# Patient Record
Sex: MALE | HISPANIC OR LATINO
[De-identification: names, ages, dates, MRNs, and addresses within clinical notes are randomized per-mention and may not be internally consistent; named-entity substitution may affect disease eponyms.]

---

## 2017-01-16 ENCOUNTER — PRIOR ORIGINAL RECORDS (OUTPATIENT)
Dept: OTHER | Age: 52
End: 2017-01-16

## 2017-01-17 LAB
ALT (SGPT): 37 U/L
AST (SGOT): 27 U/L
CHOLESTEROL, TOTAL: 125 MG/DL
HDL CHOLESTEROL: 43 MG/DL
LDL CHOLESTEROL: 52 MG/DL
TRIGLYCERIDES: 149 MG/DL

## 2017-01-23 ENCOUNTER — PRIOR ORIGINAL RECORDS (OUTPATIENT)
Dept: OTHER | Age: 52
End: 2017-01-23

## 2017-01-23 LAB
BILIRUBIN TOTAL: 0.7 MG/DL
GLOBULIN: 2.8 G/DL
SGOT (AST): 27 IU/L
SGPT (ALT): 37 IU/L

## 2017-02-01 ENCOUNTER — PRIOR ORIGINAL RECORDS (OUTPATIENT)
Dept: OTHER | Age: 52
End: 2017-02-01

## 2017-06-30 ENCOUNTER — PRIOR ORIGINAL RECORDS (OUTPATIENT)
Dept: OTHER | Age: 52
End: 2017-06-30

## 2017-07-01 ENCOUNTER — LAB ENCOUNTER (OUTPATIENT)
Dept: LAB | Facility: HOSPITAL | Age: 52
End: 2017-07-01
Attending: INTERNAL MEDICINE
Payer: COMMERCIAL

## 2017-07-01 ENCOUNTER — PRIOR ORIGINAL RECORDS (OUTPATIENT)
Dept: OTHER | Age: 52
End: 2017-07-01

## 2017-07-01 DIAGNOSIS — E78.2 MIXED HYPERLIPIDEMIA: Primary | ICD-10-CM

## 2017-07-01 DIAGNOSIS — E78.00 HYPERCHOLESTEREMIA: ICD-10-CM

## 2017-07-01 LAB
ALT SERPL-CCNC: 40 U/L (ref 17–63)
AST SERPL-CCNC: 31 U/L (ref 15–41)
CHOLEST SERPL-MCNC: 134 MG/DL (ref 110–200)
CK SERPL-CCNC: 113 U/L (ref 49–397)
HDLC SERPL-MCNC: 43 MG/DL
LDLC SERPL CALC-MCNC: 65 MG/DL (ref 0–99)
NONHDLC SERPL-MCNC: 91 MG/DL
TRIGL SERPL-MCNC: 131 MG/DL (ref 1–149)

## 2017-07-01 PROCEDURE — 84450 TRANSFERASE (AST) (SGOT): CPT

## 2017-07-01 PROCEDURE — 82550 ASSAY OF CK (CPK): CPT

## 2017-07-01 PROCEDURE — 84460 ALANINE AMINO (ALT) (SGPT): CPT

## 2017-07-01 PROCEDURE — 36415 COLL VENOUS BLD VENIPUNCTURE: CPT

## 2017-07-01 PROCEDURE — 80061 LIPID PANEL: CPT

## 2017-07-03 ENCOUNTER — APPOINTMENT (OUTPATIENT)
Dept: ULTRASOUND IMAGING | Facility: HOSPITAL | Age: 52
End: 2017-07-03
Payer: COMMERCIAL

## 2017-07-03 ENCOUNTER — HOSPITAL ENCOUNTER (EMERGENCY)
Facility: HOSPITAL | Age: 52
Discharge: HOME | End: 2017-07-03
Payer: COMMERCIAL

## 2017-07-03 VITALS
BODY MASS INDEX: 29.73 KG/M2 | OXYGEN SATURATION: 97 % | DIASTOLIC BLOOD PRESSURE: 86 MMHG | HEART RATE: 75 BPM | RESPIRATION RATE: 18 BRPM | WEIGHT: 185 LBS | TEMPERATURE: 99 F | SYSTOLIC BLOOD PRESSURE: 129 MMHG | HEIGHT: 66 IN

## 2017-07-03 DIAGNOSIS — J02.9 ACUTE PHARYNGITIS, UNSPECIFIED ETIOLOGY: ICD-10-CM

## 2017-07-03 DIAGNOSIS — N50.819 TESTICULAR PAIN: Primary | ICD-10-CM

## 2017-07-03 LAB
BACTERIA UR QL AUTO: NEGATIVE /HPF
BILIRUB UR QL: NEGATIVE
CLARITY UR: CLEAR
COLOR UR: YELLOW
GLUCOSE UR-MCNC: NEGATIVE MG/DL
KETONES UR-MCNC: NEGATIVE MG/DL
LEUKOCYTE ESTERASE UR QL STRIP.AUTO: NEGATIVE
NITRITE UR QL STRIP.AUTO: NEGATIVE
PH UR: 6 [PH] (ref 5–8)
PROT UR-MCNC: NEGATIVE MG/DL
RBC #/AREA URNS AUTO: 7 /HPF
S PYO AG THROAT QL: NEGATIVE
SP GR UR STRIP: 1.02 (ref 1–1.03)
UROBILINOGEN UR STRIP-ACNC: <2
VIT C UR-MCNC: NEGATIVE MG/DL
WBC #/AREA URNS AUTO: 1 /HPF

## 2017-07-03 PROCEDURE — 93975 VASCULAR STUDY: CPT

## 2017-07-03 PROCEDURE — 99284 EMERGENCY DEPT VISIT MOD MDM: CPT

## 2017-07-03 PROCEDURE — 76870 US EXAM SCROTUM: CPT

## 2017-07-03 PROCEDURE — 87430 STREP A AG IA: CPT

## 2017-07-03 PROCEDURE — 87591 N.GONORRHOEAE DNA AMP PROB: CPT | Performed by: NURSE PRACTITIONER

## 2017-07-03 PROCEDURE — 81001 URINALYSIS AUTO W/SCOPE: CPT

## 2017-07-03 PROCEDURE — 87491 CHLMYD TRACH DNA AMP PROBE: CPT | Performed by: NURSE PRACTITIONER

## 2017-07-03 NOTE — ED INITIAL ASSESSMENT (HPI)
Sore throat x 1 week, c/o left testicle pain x 4 days.  Pt denies testicular swelling, denies injury

## 2017-07-04 NOTE — ED PROVIDER NOTES
Patient Seen in: [facility] AND [facility] Emergency Department    History   CC: sore throat and testicular pain  HPI: Kavita Maximiliano 46year old male  who presents to the ER c/o sore throat that has been presence, constant in nature for the past approximately tablet (81 mg total) by mouth daily. Atorvastatin Calcium 20 MG Oral Tab,  Take 1 tablet (20 mg total) by mouth nightly. RaNITidine HCl 150 MG Oral Tab,  Take 150 mg by mouth nightly. Constitutional and vital signs reviewed.         Physical E Impression:  Testicular pain  (primary encounter diagnosis)  Acute pharyngitis, unspecified etiology    Disposition:  Discharge    Follow-up:  Radha Urias MD  [address]  [phone]    Schedule an appointment as soon

## 2017-07-04 NOTE — ED NOTES
Patient accompanied to ED with family for c/o left testicular pain and sore throat X1 wk, patient denies any fever/chills, was on abx for throat infection couple of weeks ago. Patient denies any swelling or injury to scrotum.   Patient denies any urinary co

## 2017-07-05 ENCOUNTER — PRIOR ORIGINAL RECORDS (OUTPATIENT)
Dept: OTHER | Age: 52
End: 2017-07-05

## 2017-07-05 LAB
C TRACH DNA SPEC QL NAA+PROBE: NEGATIVE
N GONORRHOEA DNA SPEC QL NAA+PROBE: NEGATIVE

## 2017-07-06 ENCOUNTER — TELEPHONE (OUTPATIENT)
Dept: SURGERY | Facility: CLINIC | Age: 52
End: 2017-07-06

## 2017-07-06 DIAGNOSIS — R35.1 NOCTURIA: ICD-10-CM

## 2017-07-06 DIAGNOSIS — Z12.5 PROSTATE CANCER SCREENING: ICD-10-CM

## 2017-07-06 DIAGNOSIS — R35.1 BPH ASSOCIATED WITH NOCTURIA: Primary | ICD-10-CM

## 2017-07-06 DIAGNOSIS — N40.1 BPH ASSOCIATED WITH NOCTURIA: Primary | ICD-10-CM

## 2017-07-06 NOTE — TELEPHONE ENCOUNTER
Pt. wants to know if he needs to get any labs done before he comes in for f/up on 7/17? Pt. Was last seen on 3/17/15.

## 2017-07-07 NOTE — TELEPHONE ENCOUNTER
, please see message below. I have pended the orders for you, please review and sign,if you agree. See portion of plan from your lov copied and pasted below as follows. Please note , LOV was 3/17/15    3.  If above tests normal, return to see us in 1

## 2017-07-09 NOTE — TELEPHONE ENCOUNTER
Please notify patient that he is to get PSA and complete urinalysis before visit with me--lab orders signed.   Thanks, Dr. Sabrina Shields

## 2017-07-10 ENCOUNTER — PRIOR ORIGINAL RECORDS (OUTPATIENT)
Dept: OTHER | Age: 52
End: 2017-07-10

## 2017-07-10 NOTE — TELEPHONE ENCOUNTER
LM for pt about the instructions below and told him the orders are in the system and to cb if questions.

## 2017-07-12 ENCOUNTER — APPOINTMENT (OUTPATIENT)
Dept: LAB | Facility: HOSPITAL | Age: 52
End: 2017-07-12
Attending: UROLOGY
Payer: COMMERCIAL

## 2017-07-12 DIAGNOSIS — Z12.5 PROSTATE CANCER SCREENING: ICD-10-CM

## 2017-07-12 DIAGNOSIS — R35.1 NOCTURIA: ICD-10-CM

## 2017-07-12 LAB
BACTERIA UR QL AUTO: NEGATIVE /HPF
BILIRUB UR QL: NEGATIVE
COLOR UR: YELLOW
GLUCOSE UR-MCNC: NEGATIVE MG/DL
HGB UR QL STRIP.AUTO: NEGATIVE
KETONES UR-MCNC: NEGATIVE MG/DL
LEUKOCYTE ESTERASE UR QL STRIP.AUTO: NEGATIVE
NITRITE UR QL STRIP.AUTO: NEGATIVE
PH UR: 7 [PH] (ref 5–8)
PROT UR-MCNC: NEGATIVE MG/DL
PSA SERPL-MCNC: 1.4 NG/ML (ref 0–4)
RBC #/AREA URNS AUTO: 2 /HPF
SP GR UR STRIP: 1.02 (ref 1–1.03)
UROBILINOGEN UR STRIP-ACNC: <2
VIT C UR-MCNC: 20 MG/DL
WBC #/AREA URNS AUTO: <1 /HPF

## 2017-07-12 PROCEDURE — 81003 URINALYSIS AUTO W/O SCOPE: CPT

## 2017-07-12 PROCEDURE — 36415 COLL VENOUS BLD VENIPUNCTURE: CPT

## 2017-07-17 ENCOUNTER — OFFICE VISIT (OUTPATIENT)
Dept: SURGERY | Facility: CLINIC | Age: 52
End: 2017-07-17

## 2017-07-17 ENCOUNTER — APPOINTMENT (OUTPATIENT)
Dept: LAB | Facility: HOSPITAL | Age: 52
End: 2017-07-17
Attending: UROLOGY
Payer: COMMERCIAL

## 2017-07-17 VITALS
TEMPERATURE: 98 F | WEIGHT: 190 LBS | BODY MASS INDEX: 30.53 KG/M2 | HEIGHT: 66 IN | RESPIRATION RATE: 16 BRPM | DIASTOLIC BLOOD PRESSURE: 80 MMHG | SYSTOLIC BLOOD PRESSURE: 110 MMHG | HEART RATE: 73 BPM

## 2017-07-17 DIAGNOSIS — Z12.5 PROSTATE CANCER SCREENING: ICD-10-CM

## 2017-07-17 DIAGNOSIS — R31.29 MICROHEMATURIA: ICD-10-CM

## 2017-07-17 DIAGNOSIS — N50.812 TESTICULAR PAIN, LEFT: Primary | ICD-10-CM

## 2017-07-17 DIAGNOSIS — R35.1 NOCTURIA: ICD-10-CM

## 2017-07-17 DIAGNOSIS — N32.0 BLADDER NECK OBSTRUCTION: ICD-10-CM

## 2017-07-17 LAB — PSA SERPL-MCNC: 1.5 NG/ML (ref 0–4)

## 2017-07-17 PROCEDURE — 99215 OFFICE O/P EST HI 40 MIN: CPT | Performed by: UROLOGY

## 2017-07-17 PROCEDURE — 99212 OFFICE O/P EST SF 10 MIN: CPT | Performed by: UROLOGY

## 2017-07-17 PROCEDURE — 36415 COLL VENOUS BLD VENIPUNCTURE: CPT

## 2017-07-17 RX ORDER — AMOXICILLIN AND CLAVULANATE POTASSIUM 875; 125 MG/1; MG/1
TABLET, FILM COATED ORAL
Refills: 0 | COMMUNITY
Start: 2017-05-23 | End: 2017-08-18

## 2017-07-17 RX ORDER — ASPIRIN 81 MG/1
81 TABLET ORAL
Refills: 7 | COMMUNITY
Start: 2017-05-19 | End: 2017-07-17

## 2017-07-17 RX ORDER — IBUPROFEN 600 MG/1
TABLET ORAL
Refills: 0 | COMMUNITY
Start: 2017-05-23

## 2017-07-17 RX ORDER — ROSUVASTATIN CALCIUM 5 MG/1
2.5 TABLET, COATED ORAL NIGHTLY
COMMUNITY
Start: 2017-07-10

## 2017-07-17 NOTE — PATIENT INSTRUCTIONS
1.  This evening urine specimen for cytology and the laboratory--look for possible microscopic tumor cells in the urine    2. CT urogram (CT of abdomen and pelvis with and without IV contrast) = to investigate the microscopic blood in your urine.   The CT thinners, or anti-platelet or anti-inflammatory medicines. These include ibuprofen and naproxen. These thin the blood and may increase bleeding. Follow-up care  Follow up with your healthcare provider, or as advised.  If you were injured and had blood in y

## 2017-07-17 NOTE — PROGRESS NOTES
HPI:    Patient ID: Hesham Brown is a 46year old male. HPI   1.  Voiding Dysfunction  Patient has current AUA score of 5, mild voiding dysfunction category, compared to previous score of 9, moderate voiding dysfunction category, on 3/17/2015 per [employer] ordered Uroxatral but patient admits he never took it.  consultation With me January 31, 2000, for voluntary sterilization. October 21, 2002, office visit, patient complaining that his stream was weak half of the time.  A month prior to that, he complained cancer-prostate or  [Other] [OTHER]     • No urolithiasis [Other] [OTHER]        Smoking status: Former Smoker                                                              Packs/day: 0.00      Years: 0.00         Types: Cigarettes  Comment: 1 unit per [employer] kg/m².    LABORATORIES  7/12/2017: RBC: 2;   7/12/2017: PSA: 1.4  7/5/2017: Chlamydia/GC: negative  7/3/2017: rbc: 7  11/21/2016: Creatine: 0.76, eGFR= >60  3/7/2015: PSA 1.0 and normal  Esimated GFR 2/24/15 = greater than 60, normal  Total PSA 3/7/12 = 0. understands all of this and wants to proceed with proposed workup and procedures listed above. He wants the cystoscopy, possible biopsy, possible bilateral retrograde pyelogram, under general anesthesia and does not want MAC anesthesia.  Pre-op instructions UROGRAM(W+WO)(CPT=74178)       PU#[id number]  By signing my name below, Mancil Primer,  attest that this documentation has been prepared under the direction and in the presence of Dimas Jordan MD.   Electronically Signed:  Carlos Haque, 7/17/2017, 6:01 P

## 2017-07-18 ENCOUNTER — TELEPHONE (OUTPATIENT)
Dept: SURGERY | Facility: CLINIC | Age: 52
End: 2017-07-18

## 2017-07-18 LAB — NON GYNE INTERPRETATION: NEGATIVE

## 2017-07-24 NOTE — ADDENDUM NOTE
Encounter addended by: Shakila Albarran MD on: 7/23/2017  8:31 PM<BR>    Actions taken: Letter status changed

## 2017-08-10 ENCOUNTER — TELEPHONE (OUTPATIENT)
Dept: SURGERY | Facility: CLINIC | Age: 52
End: 2017-08-10

## 2017-08-14 NOTE — TELEPHONE ENCOUNTER
Spoke with patient, has scheduled Ct Urogram for Thursday 08/17/17, procedure is also scheduled for Cysto, possible urine biopsy, possible bilateral rgpg on Thursday 08/24/17, at [address]/outpaient went over pre-op/ labs with patient will mail out

## 2017-08-17 ENCOUNTER — HOSPITAL ENCOUNTER (OUTPATIENT)
Dept: CT IMAGING | Facility: HOSPITAL | Age: 52
Discharge: HOME | End: 2017-08-17
Attending: UROLOGY
Payer: COMMERCIAL

## 2017-08-17 DIAGNOSIS — R31.29 MICROHEMATURIA: ICD-10-CM

## 2017-08-17 LAB — CREAT BLD-MCNC: 0.8 MG/DL (ref 0.5–1.5)

## 2017-08-17 PROCEDURE — 82565 ASSAY OF CREATININE: CPT

## 2017-08-17 PROCEDURE — 74178 CT ABD&PLV WO CNTR FLWD CNTR: CPT | Performed by: UROLOGY

## 2017-08-18 ENCOUNTER — TELEPHONE (OUTPATIENT)
Dept: SURGERY | Facility: CLINIC | Age: 52
End: 2017-08-18

## 2017-08-18 NOTE — TELEPHONE ENCOUNTER
Spoke with pt and gave results and instructions and he verbalized understanding and will comply. I also gave Kristofer Ordoñez a copy of the CT scan for the pt's surgery chart.

## 2017-08-18 NOTE — TELEPHONE ENCOUNTER
----- Message from Ok Stephenson MD sent at 8/18/2017  7:21 AM CDT -----  Elle Aguilera, please call patient notify him that CT urogram did not show any cancers or stones of the urinary tract; there is a tiny cyst in the right kidney; that tiny cyst is not can

## 2017-08-24 ENCOUNTER — ANESTHESIA EVENT (OUTPATIENT)
Dept: SURGERY | Facility: HOSPITAL | Age: 52
End: 2017-08-24
Payer: COMMERCIAL

## 2017-08-24 ENCOUNTER — TELEPHONE (OUTPATIENT)
Dept: SURGERY | Facility: CLINIC | Age: 52
End: 2017-08-24

## 2017-08-24 ENCOUNTER — SURGERY (OUTPATIENT)
Age: 52
End: 2017-08-24

## 2017-08-24 ENCOUNTER — HOSPITAL ENCOUNTER (OUTPATIENT)
Facility: HOSPITAL | Age: 52
Setting detail: HOSPITAL OUTPATIENT SURGERY
Discharge: HOME | End: 2017-08-24
Attending: UROLOGY | Admitting: UROLOGY
Payer: COMMERCIAL

## 2017-08-24 ENCOUNTER — ANESTHESIA (OUTPATIENT)
Dept: SURGERY | Facility: HOSPITAL | Age: 52
End: 2017-08-24
Payer: COMMERCIAL

## 2017-08-24 ENCOUNTER — APPOINTMENT (OUTPATIENT)
Dept: GENERAL RADIOLOGY | Facility: HOSPITAL | Age: 52
End: 2017-08-24
Attending: UROLOGY
Payer: COMMERCIAL

## 2017-08-24 VITALS
WEIGHT: 180 LBS | OXYGEN SATURATION: 99 % | HEART RATE: 53 BPM | DIASTOLIC BLOOD PRESSURE: 73 MMHG | SYSTOLIC BLOOD PRESSURE: 115 MMHG | TEMPERATURE: 98 F | RESPIRATION RATE: 13 BRPM | BODY MASS INDEX: 28.93 KG/M2 | HEIGHT: 66 IN

## 2017-08-24 DIAGNOSIS — R31.29 MICROHEMATURIA: ICD-10-CM

## 2017-08-24 DIAGNOSIS — R31.29 MICROHEMATURIA: Primary | ICD-10-CM

## 2017-08-24 DIAGNOSIS — Z12.5 PROSTATE CANCER SCREENING: Primary | ICD-10-CM

## 2017-08-24 PROCEDURE — BT141ZZ FLUOROSCOPY OF KIDNEYS, URETERS AND BLADDER USING LOW OSMOLAR CONTRAST: ICD-10-PCS | Performed by: UROLOGY

## 2017-08-24 PROCEDURE — 3E0K8KZ INTRODUCTION OF OTHER DIAGNOSTIC SUBSTANCE INTO GENITOURINARY TRACT, VIA NATURAL OR ARTIFICIAL OPENING ENDOSCOPIC: ICD-10-PCS | Performed by: UROLOGY

## 2017-08-24 PROCEDURE — 52005 CYSTO W/URTRL CATHJ: CPT | Performed by: UROLOGY

## 2017-08-24 PROCEDURE — 0TJB8ZZ INSPECTION OF BLADDER, VIA NATURAL OR ARTIFICIAL OPENING ENDOSCOPIC: ICD-10-PCS | Performed by: UROLOGY

## 2017-08-24 PROCEDURE — 74420 UROGRAPHY RTRGR +-KUB: CPT | Performed by: UROLOGY

## 2017-08-24 RX ORDER — FAMOTIDINE 20 MG/1
20 TABLET ORAL ONCE
Status: COMPLETED | OUTPATIENT
Start: 2017-08-24 | End: 2017-08-24

## 2017-08-24 RX ORDER — ONDANSETRON 2 MG/ML
4 INJECTION INTRAMUSCULAR; INTRAVENOUS ONCE AS NEEDED
Status: DISCONTINUED | OUTPATIENT
Start: 2017-08-24 | End: 2017-08-24

## 2017-08-24 RX ORDER — HYDROCODONE BITARTRATE AND ACETAMINOPHEN 5; 325 MG/1; MG/1
2 TABLET ORAL AS NEEDED
Status: DISCONTINUED | OUTPATIENT
Start: 2017-08-24 | End: 2017-08-24

## 2017-08-24 RX ORDER — MORPHINE SULFATE 4 MG/ML
6 INJECTION, SOLUTION INTRAMUSCULAR; INTRAVENOUS EVERY 10 MIN PRN
Status: DISCONTINUED | OUTPATIENT
Start: 2017-08-24 | End: 2017-08-24

## 2017-08-24 RX ORDER — HYDROCODONE BITARTRATE AND ACETAMINOPHEN 5; 325 MG/1; MG/1
1 TABLET ORAL AS NEEDED
Status: DISCONTINUED | OUTPATIENT
Start: 2017-08-24 | End: 2017-08-24

## 2017-08-24 RX ORDER — HYDROCODONE BITARTRATE AND ACETAMINOPHEN 5; 325 MG/1; MG/1
1 TABLET ORAL EVERY 4 HOURS PRN
Status: CANCELLED | OUTPATIENT
Start: 2017-08-24

## 2017-08-24 RX ORDER — ONDANSETRON 2 MG/ML
INJECTION INTRAMUSCULAR; INTRAVENOUS AS NEEDED
Status: DISCONTINUED | OUTPATIENT
Start: 2017-08-24 | End: 2017-08-24 | Stop reason: SURG

## 2017-08-24 RX ORDER — ACETAMINOPHEN 325 MG/1
650 TABLET ORAL EVERY 4 HOURS PRN
Status: CANCELLED | OUTPATIENT
Start: 2017-08-24

## 2017-08-24 RX ORDER — NALOXONE HYDROCHLORIDE 0.4 MG/ML
80 INJECTION, SOLUTION INTRAMUSCULAR; INTRAVENOUS; SUBCUTANEOUS AS NEEDED
Status: DISCONTINUED | OUTPATIENT
Start: 2017-08-24 | End: 2017-08-24

## 2017-08-24 RX ORDER — SODIUM CHLORIDE, SODIUM LACTATE, POTASSIUM CHLORIDE, CALCIUM CHLORIDE 600; 310; 30; 20 MG/100ML; MG/100ML; MG/100ML; MG/100ML
INJECTION, SOLUTION INTRAVENOUS CONTINUOUS PRN
Status: DISCONTINUED | OUTPATIENT
Start: 2017-08-24 | End: 2017-08-24 | Stop reason: SURG

## 2017-08-24 RX ORDER — HYDROMORPHONE HYDROCHLORIDE 1 MG/ML
0.6 INJECTION, SOLUTION INTRAMUSCULAR; INTRAVENOUS; SUBCUTANEOUS EVERY 5 MIN PRN
Status: DISCONTINUED | OUTPATIENT
Start: 2017-08-24 | End: 2017-08-24

## 2017-08-24 RX ORDER — MORPHINE SULFATE 4 MG/ML
4 INJECTION, SOLUTION INTRAMUSCULAR; INTRAVENOUS EVERY 10 MIN PRN
Status: DISCONTINUED | OUTPATIENT
Start: 2017-08-24 | End: 2017-08-24

## 2017-08-24 RX ORDER — LIDOCAINE HYDROCHLORIDE 20 MG/ML
JELLY TOPICAL AS NEEDED
Status: DISCONTINUED | OUTPATIENT
Start: 2017-08-24 | End: 2017-08-24 | Stop reason: HOSPADM

## 2017-08-24 RX ORDER — SODIUM CHLORIDE, SODIUM LACTATE, POTASSIUM CHLORIDE, CALCIUM CHLORIDE 600; 310; 30; 20 MG/100ML; MG/100ML; MG/100ML; MG/100ML
INJECTION, SOLUTION INTRAVENOUS CONTINUOUS
Status: DISCONTINUED | OUTPATIENT
Start: 2017-08-24 | End: 2017-08-24

## 2017-08-24 RX ORDER — MORPHINE SULFATE 2 MG/ML
2 INJECTION, SOLUTION INTRAMUSCULAR; INTRAVENOUS EVERY 10 MIN PRN
Status: DISCONTINUED | OUTPATIENT
Start: 2017-08-24 | End: 2017-08-24

## 2017-08-24 RX ORDER — PHENAZOPYRIDINE HYDROCHLORIDE 200 MG/1
200 TABLET, FILM COATED ORAL ONCE AS NEEDED
Status: CANCELLED | OUTPATIENT
Start: 2017-08-24 | End: 2017-08-24

## 2017-08-24 RX ORDER — HYDROMORPHONE HYDROCHLORIDE 1 MG/ML
0.2 INJECTION, SOLUTION INTRAMUSCULAR; INTRAVENOUS; SUBCUTANEOUS EVERY 5 MIN PRN
Status: DISCONTINUED | OUTPATIENT
Start: 2017-08-24 | End: 2017-08-24

## 2017-08-24 RX ORDER — METOCLOPRAMIDE 10 MG/1
10 TABLET ORAL ONCE
Status: COMPLETED | OUTPATIENT
Start: 2017-08-24 | End: 2017-08-24

## 2017-08-24 RX ORDER — MIDAZOLAM HYDROCHLORIDE 1 MG/ML
INJECTION INTRAMUSCULAR; INTRAVENOUS AS NEEDED
Status: DISCONTINUED | OUTPATIENT
Start: 2017-08-24 | End: 2017-08-24 | Stop reason: SURG

## 2017-08-24 RX ORDER — ACETAMINOPHEN 325 MG/1
650 TABLET ORAL ONCE
Status: COMPLETED | OUTPATIENT
Start: 2017-08-24 | End: 2017-08-24

## 2017-08-24 RX ORDER — LIDOCAINE HYDROCHLORIDE 10 MG/ML
INJECTION, SOLUTION EPIDURAL; INFILTRATION; INTRACAUDAL; PERINEURAL AS NEEDED
Status: DISCONTINUED | OUTPATIENT
Start: 2017-08-24 | End: 2017-08-24 | Stop reason: SURG

## 2017-08-24 RX ORDER — HYDROMORPHONE HYDROCHLORIDE 1 MG/ML
0.4 INJECTION, SOLUTION INTRAMUSCULAR; INTRAVENOUS; SUBCUTANEOUS EVERY 5 MIN PRN
Status: DISCONTINUED | OUTPATIENT
Start: 2017-08-24 | End: 2017-08-24

## 2017-08-24 RX ORDER — HYDROCODONE BITARTRATE AND ACETAMINOPHEN 5; 325 MG/1; MG/1
2 TABLET ORAL EVERY 4 HOURS PRN
Status: CANCELLED | OUTPATIENT
Start: 2017-08-24

## 2017-08-24 RX ORDER — LEVOFLOXACIN 500 MG/1
500 TABLET, FILM COATED ORAL ONCE
Status: COMPLETED | OUTPATIENT
Start: 2017-08-24 | End: 2017-08-24

## 2017-08-24 RX ORDER — DEXAMETHASONE SODIUM PHOSPHATE 4 MG/ML
VIAL (ML) INJECTION AS NEEDED
Status: DISCONTINUED | OUTPATIENT
Start: 2017-08-24 | End: 2017-08-24 | Stop reason: SURG

## 2017-08-24 RX ORDER — GENTAMICIN SULFATE 40 MG/ML
INJECTION, SOLUTION INTRAMUSCULAR; INTRAVENOUS AS NEEDED
Status: DISCONTINUED | OUTPATIENT
Start: 2017-08-24 | End: 2017-08-24 | Stop reason: HOSPADM

## 2017-08-24 RX ORDER — PHENAZOPYRIDINE HYDROCHLORIDE 200 MG/1
200 TABLET, FILM COATED ORAL 3 TIMES DAILY PRN
Qty: 15 TABLET | Refills: 1 | Status: SHIPPED | OUTPATIENT
Start: 2017-08-24

## 2017-08-24 RX ADMIN — ONDANSETRON 4 MG: 2 INJECTION INTRAMUSCULAR; INTRAVENOUS at 09:48:00

## 2017-08-24 RX ADMIN — MIDAZOLAM HYDROCHLORIDE 2 MG: 1 INJECTION INTRAMUSCULAR; INTRAVENOUS at 09:08:00

## 2017-08-24 RX ADMIN — SODIUM CHLORIDE, SODIUM LACTATE, POTASSIUM CHLORIDE, CALCIUM CHLORIDE: 600; 310; 30; 20 INJECTION, SOLUTION INTRAVENOUS at 09:45:00

## 2017-08-24 RX ADMIN — DEXAMETHASONE SODIUM PHOSPHATE 4 MG: 4 MG/ML VIAL (ML) INJECTION at 09:22:00

## 2017-08-24 RX ADMIN — SODIUM CHLORIDE, SODIUM LACTATE, POTASSIUM CHLORIDE, CALCIUM CHLORIDE: 600; 310; 30; 20 INJECTION, SOLUTION INTRAVENOUS at 09:11:00

## 2017-08-24 RX ADMIN — LIDOCAINE HYDROCHLORIDE 50 MG: 10 INJECTION, SOLUTION EPIDURAL; INFILTRATION; INTRACAUDAL; PERINEURAL at 09:15:00

## 2017-08-24 NOTE — ANESTHESIA PREPROCEDURE EVALUATION
Anesthesia PreOp Note    HPI:     Mahin Cope is a 46year old male who presents for preoperative consultation requested by: Kelsey Sutton MD    Date of Surgery: 8/24/2017    Procedure(s):  CYSTOSCOPY RETROGRADE  Indication: microhematuria      Hi Glo Sesay CRNA 250 mg at 08/24/17 0915   dexamethasone Sodium Phosphate (DECADRON) 4 MG/ML injection Intravenous PRN Glo Sesay CRNA 4 mg at 08/24/17 6921   lactated ringers infusion Intravenous Continuous PRN Glo Sesay II  Neck ROM: full  Dental - normal exam     Pulmonary - normal exam   Cardiovascular - normal exam  (+) CAD,     Neuro/Psych      GI/Hepatic/Renal    (+) liver disease,     Endo/Other    Abdominal  - normal exam             Anesthesia Plan:   ASA:  2  Evaristo

## 2017-08-24 NOTE — ANESTHESIA POSTPROCEDURE EVALUATION
Patient: Estela Lou    Procedure Summary     Date:  08/24/17 Room / Location:  [address] [place] [address] MAIN OR 14 / [address] [place] [address] MAIN OR    Anesthesia Start:  3677 Anesthesia Stop:  1001    Procedure:  CYSTOSCOPY RETROGRADE (Bilateral ) Diagnosis:  (microhematuria)    Surgeon:  Madhu Garrett

## 2017-08-24 NOTE — INTERVAL H&P NOTE
Pre-op Diagnosis: microhematuria    Today patient denies any gross hematuria or flank pain.]\    Today on physical exam--vital signs stable. Cardiac exam--regular rate and rhythm with normal S1, S2 without murmurs. Lungs are clear to auscultation.   Flank

## 2017-08-24 NOTE — H&P
Patient ID: Nikki Hernandez is a 46year old male.     HPI   1.  Voiding Dysfunction  Patient has current AUA score of 5, mild voiding dysfunction category, compared to previous score of 9, moderate voiding dysfunction category, on 3/17/2015 per chart revi Office cystoscopy-high riding median bar the bladder neck causing visual obstruction. No lateral lobe enlargement. No bladder tumors. Bladder one-2+ trabeculated. Length the prostatic urethra 2.5 cm. I ordered Uroxatral but patient admits he never took it. Comment: inguinal hernia  No date: VASECTOMY            Family History   Problem Relation Age of Onset   • Diabetes Father     • Other[Other] [OTHER] Mother         cirrhosis of the liver   • Diabetes Brother     • No cancer-prostate or  [Other] [OTH Psychiatric: His behavior is normal.   Nursing note and vitals reviewed.          07/17/17  1756   BP: 110/80   Pulse: 73   Resp: 16   Temp: 98.2 °F (36.8 °C)   TempSrc: Oral   Weight: 190 lb (86.2 kg)   Height: 5' 6\" (1.676 m)            Body mass index Plan: On 7/3/17, urine RBC 7. 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   Therefore I recommend urine cytolo 2.  CT urogram (CT of abdomen and pelvis with and without IV contrast) = to investigate the microscopic blood in your urine.   The CT will also give us information on possible small groin (inguinal) hernias; it may shed light on the cause of your long-stand

## 2017-08-24 NOTE — BRIEF OP NOTE
[facility] POST ANESTHESIA CARE UNIT  Brief Op Note       Patients Name: Billie Valente  Attending Physician: No att. providers found  Operating Physician: Marco Antonio Portillo MD  CSN: [id number]     Location:  OR  MRN: [id number]    Date of Birth: [birth year]

## 2017-08-24 NOTE — TELEPHONE ENCOUNTER
Nurses, please send the following message to patient's primary physician Dr. Carolynn Meek    Urology update    8/24/17 cystoscopy with bladder irrigation for cytology, bilateral retrograde mammogram x-rays at [address] [place] [address], same-day surgery    No tumors or ston

## 2017-08-25 LAB
CHOLESTEROL, TOTAL: 134 MG/DL
HDL CHOLESTEROL: 43 MG/DL
LDL CHOLESTEROL: 65 MG/DL
NON-HDL CHOLESTEROL: 91 MG/DL
TRIGLYCERIDES: 131 MG/DL

## 2017-08-25 NOTE — OPERATIVE REPORT
[facility]    PATIENT'S NAME: Karol Avila PHYSICIAN: Governor Jaylin MD   OPERATING PHYSICIAN: Governor Jaylin MD   PATIENT ACCOUNT#:   [id number]    LOCATION:  [address]  MEDICAL RECORD #:   [id number]       D should return to see me in 1 year and get a blood draw for PSA as well as a urinalysis, urine testing, and urine for cytology before visit with me; he is instructed to call my office in the interim if he develops any new urological problems.       PROCEDURE

## 2017-08-26 ENCOUNTER — LAB ENCOUNTER (OUTPATIENT)
Dept: LAB | Facility: HOSPITAL | Age: 52
End: 2017-08-26
Attending: INTERNAL MEDICINE
Payer: COMMERCIAL

## 2017-08-26 DIAGNOSIS — E78.00 PURE HYPERCHOLESTEROLEMIA: Primary | ICD-10-CM

## 2017-08-26 LAB
ALT SERPL-CCNC: 41 U/L (ref 17–63)
AST SERPL-CCNC: 30 U/L (ref 15–41)
CHOLEST SERPL-MCNC: 87 MG/DL (ref 110–200)
CK SERPL-CCNC: 81 U/L (ref 49–397)
HDLC SERPL-MCNC: 35 MG/DL
LDLC SERPL CALC-MCNC: 33 MG/DL (ref 0–99)
NONHDLC SERPL-MCNC: 52 MG/DL
TRIGL SERPL-MCNC: 96 MG/DL (ref 1–149)

## 2017-08-26 PROCEDURE — 36415 COLL VENOUS BLD VENIPUNCTURE: CPT

## 2017-08-26 PROCEDURE — 82550 ASSAY OF CK (CPK): CPT

## 2017-08-26 PROCEDURE — 80061 LIPID PANEL: CPT

## 2017-08-26 PROCEDURE — 84450 TRANSFERASE (AST) (SGOT): CPT

## 2017-08-26 PROCEDURE — 84460 ALANINE AMINO (ALT) (SGPT): CPT

## 2017-08-30 ENCOUNTER — PRIOR ORIGINAL RECORDS (OUTPATIENT)
Dept: OTHER | Age: 52
End: 2017-08-30

## 2017-08-30 ENCOUNTER — HOSPITAL ENCOUNTER (OUTPATIENT)
Dept: CT IMAGING | Facility: HOSPITAL | Age: 52
Discharge: HOME | End: 2017-08-30
Attending: INTERNAL MEDICINE
Payer: COMMERCIAL

## 2017-08-30 DIAGNOSIS — M54.2 NECK PAIN: ICD-10-CM

## 2017-08-30 LAB — CREAT BLD-MCNC: 0.7 MG/DL (ref 0.5–1.5)

## 2017-08-30 PROCEDURE — 82565 ASSAY OF CREATININE: CPT

## 2017-08-30 PROCEDURE — 70491 CT SOFT TISSUE NECK W/DYE: CPT | Performed by: INTERNAL MEDICINE

## 2017-09-09 ENCOUNTER — OFFICE VISIT (OUTPATIENT)
Dept: OTOLARYNGOLOGY | Facility: CLINIC | Age: 52
End: 2017-09-09

## 2017-09-09 VITALS
SYSTOLIC BLOOD PRESSURE: 115 MMHG | BODY MASS INDEX: 28.93 KG/M2 | DIASTOLIC BLOOD PRESSURE: 73 MMHG | HEIGHT: 66 IN | WEIGHT: 180 LBS | TEMPERATURE: 98 F

## 2017-09-09 DIAGNOSIS — K11.20 SIALOADENITIS OF SUBMANDIBULAR GLAND: Primary | ICD-10-CM

## 2017-09-09 PROCEDURE — 99244 OFF/OP CNSLTJ NEW/EST MOD 40: CPT | Performed by: OTOLARYNGOLOGY

## 2017-09-09 PROCEDURE — 99212 OFFICE O/P EST SF 10 MIN: CPT | Performed by: OTOLARYNGOLOGY

## 2017-09-09 RX ORDER — ESOMEPRAZOLE STRONTIUM 49.3 MG/1
49.3 CAPSULE, DELAYED RELEASE ORAL DAILY
COMMUNITY

## 2017-09-09 NOTE — PROGRESS NOTES
Khalida Boateng is a 46year old male.   Patient presents with:  Jaw Pain: Pt experience jaw pain left side      HISTORY OF PRESENT ILLNESS  He presents with a history of recurrent episodes of submandibular pain and discomfort posteriorly with a CT scan per vomiting)    • Visual impairment        Past Surgical History:  No date: CHOLECYSTECTOMY      Comment: Laparoscopic   No date: HERNIA SURGERY Right      Comment: inguinal hernia  No date: VASECTOMY  No date: VASECTOMY      REVIEW OF SYSTEMS    System Neg/P Normal External nose - Normal. Lips/teeth/gums - Normal. Tonsils - Normal. Oropharynx - Normal.  Floor of the mouth with palpable 1 cm stone   Nose/Mouth/Throat Normal Nares - Right: Normal Left: Normal. Septum -Normal  Turbinates - Right: Normal, Left: No

## 2017-09-14 ENCOUNTER — PRIOR ORIGINAL RECORDS (OUTPATIENT)
Dept: OTHER | Age: 52
End: 2017-09-14

## 2017-09-19 ENCOUNTER — TELEPHONE (OUTPATIENT)
Dept: OTOLARYNGOLOGY | Facility: CLINIC | Age: 52
End: 2017-09-19

## 2017-09-19 ENCOUNTER — PRIOR ORIGINAL RECORDS (OUTPATIENT)
Dept: OTHER | Age: 52
End: 2017-09-19

## 2017-09-19 NOTE — TELEPHONE ENCOUNTER
Pt states he has had poison ivy for the past week, asking if he needs to rs 9/27 sx? Pls advise thank you.

## 2017-09-20 ENCOUNTER — PRIOR ORIGINAL RECORDS (OUTPATIENT)
Dept: OTHER | Age: 52
End: 2017-09-20

## 2017-09-20 NOTE — TELEPHONE ENCOUNTER
Spoke to pt, has poison ivy on both arms only, he is currently taking care of it with Over the counter creams. Dr. Gardenia Payton pt wants to know if he can have his surgery done 9/27/17 (Excision of left submandibular ductal stones at [facility])?

## 2017-09-25 ENCOUNTER — PRIOR ORIGINAL RECORDS (OUTPATIENT)
Dept: OTHER | Age: 52
End: 2017-09-25

## 2017-09-27 ENCOUNTER — LAB REQUISITION (OUTPATIENT)
Dept: LAB | Facility: HOSPITAL | Age: 52
End: 2017-09-27
Payer: COMMERCIAL

## 2017-09-27 DIAGNOSIS — Z01.89 ENCOUNTER FOR OTHER SPECIFIED SPECIAL EXAMINATIONS (CODE): ICD-10-CM

## 2017-09-27 PROCEDURE — 88300 SURGICAL PATH GROSS: CPT | Performed by: OTOLARYNGOLOGY

## 2017-09-28 ENCOUNTER — TELEPHONE (OUTPATIENT)
Dept: OTOLARYNGOLOGY | Facility: CLINIC | Age: 52
End: 2017-09-28

## 2017-09-28 NOTE — TELEPHONE ENCOUNTER
Per pt is doing well, slight swelling but no bleeding or fever. Pt taking antibiotic and pain medications as prescribed.  Advised pt that swelling is expected, and per , no post op visit follow up needed and per  and pt informed of results

## 2017-12-21 ENCOUNTER — HOSPITAL ENCOUNTER (OUTPATIENT)
Facility: HOSPITAL | Age: 52
Setting detail: OBSERVATION
Discharge: HOME | End: 2017-12-22
Attending: EMERGENCY MEDICINE | Admitting: HOSPITALIST
Payer: COMMERCIAL

## 2017-12-21 ENCOUNTER — APPOINTMENT (OUTPATIENT)
Dept: GENERAL RADIOLOGY | Facility: HOSPITAL | Age: 52
End: 2017-12-21
Attending: EMERGENCY MEDICINE
Payer: COMMERCIAL

## 2017-12-21 DIAGNOSIS — R07.9 CHEST PAIN OF UNCERTAIN ETIOLOGY: Primary | ICD-10-CM

## 2017-12-21 PROBLEM — R73.9 HYPERGLYCEMIA: Status: ACTIVE | Noted: 2017-12-21

## 2017-12-21 PROBLEM — E87.6 HYPOKALEMIA: Status: ACTIVE | Noted: 2017-12-21

## 2017-12-21 PROCEDURE — 99220 INITIAL OBSERVATION CARE,LEVL III: CPT | Performed by: HOSPITALIST

## 2017-12-21 PROCEDURE — 71010 XR CHEST AP PORTABLE  (CPT=71010): CPT | Performed by: EMERGENCY MEDICINE

## 2017-12-21 RX ORDER — UBIDECARENONE 100 MG
100 CAPSULE ORAL DAILY
COMMUNITY

## 2017-12-21 RX ORDER — NITROGLYCERIN 0.4 MG/1
0.4 TABLET SUBLINGUAL ONCE
Status: COMPLETED | OUTPATIENT
Start: 2017-12-21 | End: 2017-12-21

## 2017-12-21 RX ORDER — MORPHINE SULFATE 2 MG/ML
1 INJECTION, SOLUTION INTRAMUSCULAR; INTRAVENOUS EVERY 2 HOUR PRN
Status: DISCONTINUED | OUTPATIENT
Start: 2017-12-21 | End: 2017-12-22

## 2017-12-21 RX ORDER — SODIUM CHLORIDE 0.9 % (FLUSH) 0.9 %
3 SYRINGE (ML) INJECTION AS NEEDED
Status: DISCONTINUED | OUTPATIENT
Start: 2017-12-21 | End: 2017-12-22

## 2017-12-21 RX ORDER — ONDANSETRON 2 MG/ML
4 INJECTION INTRAMUSCULAR; INTRAVENOUS EVERY 6 HOURS PRN
Status: DISCONTINUED | OUTPATIENT
Start: 2017-12-21 | End: 2017-12-22

## 2017-12-21 RX ORDER — NITROGLYCERIN 0.4 MG/1
TABLET SUBLINGUAL
Status: COMPLETED
Start: 2017-12-21 | End: 2017-12-21

## 2017-12-21 RX ORDER — MORPHINE SULFATE 4 MG/ML
4 INJECTION, SOLUTION INTRAMUSCULAR; INTRAVENOUS EVERY 2 HOUR PRN
Status: DISCONTINUED | OUTPATIENT
Start: 2017-12-21 | End: 2017-12-22

## 2017-12-21 RX ORDER — MORPHINE SULFATE 2 MG/ML
INJECTION, SOLUTION INTRAMUSCULAR; INTRAVENOUS
Status: COMPLETED
Start: 2017-12-21 | End: 2017-12-21

## 2017-12-21 RX ORDER — MORPHINE SULFATE 2 MG/ML
2 INJECTION, SOLUTION INTRAMUSCULAR; INTRAVENOUS ONCE
Status: COMPLETED | OUTPATIENT
Start: 2017-12-21 | End: 2017-12-21

## 2017-12-21 RX ORDER — HEPARIN SODIUM 5000 [USP'U]/ML
5000 INJECTION, SOLUTION INTRAVENOUS; SUBCUTANEOUS EVERY 12 HOURS SCHEDULED
Status: DISCONTINUED | OUTPATIENT
Start: 2017-12-21 | End: 2017-12-22

## 2017-12-21 RX ORDER — ASPIRIN 81 MG/1
324 TABLET, CHEWABLE ORAL ONCE
Status: COMPLETED | OUTPATIENT
Start: 2017-12-21 | End: 2017-12-21

## 2017-12-21 RX ORDER — MORPHINE SULFATE 2 MG/ML
2 INJECTION, SOLUTION INTRAMUSCULAR; INTRAVENOUS EVERY 2 HOUR PRN
Status: DISCONTINUED | OUTPATIENT
Start: 2017-12-21 | End: 2017-12-22

## 2017-12-21 RX ORDER — ACETAMINOPHEN 325 MG/1
650 TABLET ORAL EVERY 6 HOURS PRN
Status: DISCONTINUED | OUTPATIENT
Start: 2017-12-21 | End: 2017-12-22

## 2017-12-21 RX ORDER — ASPIRIN 325 MG
325 TABLET ORAL DAILY
Status: DISCONTINUED | OUTPATIENT
Start: 2017-12-21 | End: 2017-12-22

## 2017-12-21 RX ORDER — NITROGLYCERIN 0.4 MG/1
0.4 TABLET SUBLINGUAL EVERY 5 MIN PRN
Status: DISCONTINUED | OUTPATIENT
Start: 2017-12-21 | End: 2017-12-22

## 2017-12-21 RX ORDER — TAMSULOSIN HYDROCHLORIDE 0.4 MG/1
0.4 CAPSULE ORAL DAILY
COMMUNITY

## 2017-12-21 RX ORDER — POTASSIUM CHLORIDE 20 MEQ/1
40 TABLET, EXTENDED RELEASE ORAL EVERY 4 HOURS
Status: COMPLETED | OUTPATIENT
Start: 2017-12-21 | End: 2017-12-21

## 2017-12-21 NOTE — CONSULTS
Pedro NOTE  Cardiology Consultation    Radha Spray Patient Status:  Observation    1965 MRN [id number]   Location [facility] 3W/SW Attending Neeta Tubbs MD   Hosp Day # 0 PCP Yadi Yi MD, Valencia Armstrong MD     [facility] and cholesterol have been well treated    History:  Past Medical History:   Diagnosis Date   • BPH (benign prostatic hyperplasia)    • Coronary atherosclerosis    • Disorder of liver     fatty liver   • Esophageal reflux    • H/O right inguinal hernia repa Flush 0.9 % injection 3 mL, 3 mL, Intravenous, PRN  •  Heparin Sodium (Porcine) 5000 UNIT/ML injection 5,000 Units, 5,000 Units, Subcutaneous, 2 times per day  •  acetaminophen (TYLENOL) tab 650 mg, 650 mg, Oral, Q6H PRN  •  ondansetron HCl (ZOFRAN) inject 250 12/21/2017   CREATSERUM 0.78 12/21/2017   BUN 7 12/21/2017    12/21/2017   K 3.3 12/21/2017    12/21/2017   CO2 23 12/21/2017    12/21/2017   CA 8.6 12/21/2017   DDIMER 0.40 12/21/2017   TROP 0.00 12/21/2017       Imaging:  Stable ch

## 2017-12-21 NOTE — PLAN OF CARE
Problem: SAFETY ADULT - FALL  Goal: Free from fall injury  INTERVENTIONS:  - Assess pt frequently for physical needs  - Identify cognitive and physical deficits and behaviors that affect risk of falls.   - [place] fall precautions as indicated by assessme

## 2017-12-21 NOTE — H&P
[facility]    PATIENT'S NAME: Ally Medina PHYSICIAN: Sukhi Prater MD   PATIENT ACCOUNT#:   [id number]    LOCATION:  [place] [address]  MEDICAL RECORD #:   [id number]       YOB: 1965  ADMISSION DATE:       12/21/ time, place, and person. Anxious, but no distress. VITAL SIGNS:  Temperature 98.7, pulse 100, respiratory rate 14, blood pressure 139/91, pulse oximetry 99% on room air. HEENT:  Atraumatic. Oropharynx clear with moist mucous membranes.   Normal hard a

## 2017-12-21 NOTE — ED PROVIDER NOTES
Patient Seen in: [facility] AND [facility] Emergency Department    History   Patient presents with:  Chest Pain    Stated Complaint: Chest pain    HPI    Patient presents the emergency department complaining of chest pain.   He states that while he was driving he (37.1 °C) (Temporal)   Resp 14   Ht 167.6 cm (5' 6\")   Wt 78.5 kg   SpO2 96%   BMI 27.92 kg/m²         Physical Exam   Constitutional: He is oriented to person, place, and time. He appears well-developed and well-nourished. No distress.    HENT:   Head: No 1630  ------------------------------------------------------------       [facility]     Pulse Ox: 96%, Normal,     Cardiac Monitor: Pulse Readings from Last 1 Encounters:  12/21/17 : 77  , sinus,      Radiology findings: Xr Chest Ap Portable  (cpt=71010)    Resul

## 2017-12-21 NOTE — HISTORICAL OFFICE NOTE
Sagar Ness  : 1965  ACCOUNT:  [id number]  [phone]  PCP:    TODAY'S DATE: 2017  DICTATED BY:  Crys Murphy MD]    CHIEF COMPLAINT: [Followup of CAD, of native vessels, Followup of Hyperlipidemia and mixed.]    HPI: [On 2017, G 110/60, Pulse - 74, Respiration - 18, Weight - 185, Height - 66, BMI - 29.9 ]    CONS: well developed, well nourished. WEIGHT: BMI parameters reviewed and discussed. HEAD/FACE: no trauma and normocephalic.  EYES: conjunctivae not injected and no xanthelasma

## 2017-12-21 NOTE — ED INITIAL ASSESSMENT (HPI)
Pt states that he felt a left-sided chest pressure about 1 hour prior to arrival.  This lasted for a minute. He denies shortness of breath, nausea, dizziness. He was admitted pain-free.   He states this happened to him a few months ago but he did not seek

## 2017-12-22 ENCOUNTER — APPOINTMENT (OUTPATIENT)
Dept: CV DIAGNOSTICS | Facility: HOSPITAL | Age: 52
End: 2017-12-22
Attending: INTERNAL MEDICINE
Payer: COMMERCIAL

## 2017-12-22 ENCOUNTER — APPOINTMENT (OUTPATIENT)
Dept: NUCLEAR MEDICINE | Facility: HOSPITAL | Age: 52
End: 2017-12-22
Attending: INTERNAL MEDICINE
Payer: COMMERCIAL

## 2017-12-22 VITALS
SYSTOLIC BLOOD PRESSURE: 114 MMHG | HEIGHT: 66 IN | DIASTOLIC BLOOD PRESSURE: 66 MMHG | HEART RATE: 88 BPM | TEMPERATURE: 98 F | RESPIRATION RATE: 17 BRPM | OXYGEN SATURATION: 95 % | WEIGHT: 163.13 LBS | BODY MASS INDEX: 26.22 KG/M2

## 2017-12-22 PROCEDURE — 99217 OBSERVATION CARE DISCHARGE: CPT | Performed by: HOSPITALIST

## 2017-12-22 PROCEDURE — 93306 TTE W/DOPPLER COMPLETE: CPT | Performed by: INTERNAL MEDICINE

## 2017-12-22 PROCEDURE — 93017 CV STRESS TEST TRACING ONLY: CPT | Performed by: INTERNAL MEDICINE

## 2017-12-22 PROCEDURE — 78452 HT MUSCLE IMAGE SPECT MULT: CPT | Performed by: INTERNAL MEDICINE

## 2017-12-22 RX ORDER — SODIUM CHLORIDE 9 MG/ML
INJECTION, SOLUTION INTRAVENOUS
Status: COMPLETED
Start: 2017-12-22 | End: 2017-12-22

## 2017-12-22 NOTE — PROGRESS NOTES
12/21/17 8464   Clinical Encounter Type   Visited With Patient   Routine Visit Introduction  ([facility])   Continue Visiting Yes   Patient Spiritual Encounters   Spiritual Assessment Completed 1   Adaptation to Hospital 3   Fear Level 4   F

## 2017-12-22 NOTE — PROGRESS NOTES
[facility]    Progress Note    Nikki Hilton Patient Status:  Observation    1965 MRN [id number]   Location [facility] 3W/SW Attending Russell Garcia MD   Hosp Day # 0 PCP Ema Pacheco MD, Kisha Crain MD     Subjective: change Electronically signed on 12/21/2017 at 16:19 by Amaya Gore MD    Ekg 12-lead    Result Date: 12/21/2017  ECG Report  Interpretation  -------------------------- Sinus Tachycardia WITHIN NORMAL LIMITS When compared with ECG of 12/21/2017 14:16:20 El

## 2017-12-22 NOTE — DISCHARGE SUMMARY
[facility] HOSPITALIST  DISCHARGE SUMMARY     Nikki Hernandez Patient Status:  Observation    1965 MRN [id number]   Location [facility] 3W/SW Attending Jose Barrett MD   Hosp Day # 0 PCP Harinder Bhatt MD, Latonia Kong MD     Date of Admission: 15 HOURS   Refills:  0     Phenazopyridine HCl 200 MG Tabs  Commonly known as:  PYRIDIUM      Take 1 tablet (200 mg total) by mouth 3 (three) times daily as needed (for burning pain with urination).    Quantity:  15 tablet  Refills:  1     Rosuvastatin Calcium has Moderate Risk of readmission after discharge from the hospital.        Amaya Bergeron MD 12/22/2017    Time spent:  > 35 minutes

## 2017-12-22 NOTE — PLAN OF CARE
CARDIOVASCULAR - ADULT    • Maintains optimal cardiac output and hemodynamic stability Progressing    • Absence of cardiac arrhythmias or at baseline Progressing    No chest pain, plan for stress test this AM     DISCHARGE PLANNING    • Discharge to home o

## 2018-10-16 ENCOUNTER — PRIOR ORIGINAL RECORDS (OUTPATIENT)
Dept: OTHER | Age: 53
End: 2018-10-16

## 2018-10-25 ENCOUNTER — PRIOR ORIGINAL RECORDS (OUTPATIENT)
Dept: OTHER | Age: 53
End: 2018-10-25

## 2018-10-26 LAB
ALT (SGPT): 32 U/L
AST (SGOT): 25 U/L
CHOLESTEROL, TOTAL: 87 MG/DL
HDL CHOLESTEROL: 43 MG/DL
LDL CHOLESTEROL: 29 MG/DL
TRIGLYCERIDES: 66 MG/DL

## 2018-10-29 ENCOUNTER — PRIOR ORIGINAL RECORDS (OUTPATIENT)
Dept: OTHER | Age: 53
End: 2018-10-29

## 2018-10-31 ENCOUNTER — PRIOR ORIGINAL RECORDS (OUTPATIENT)
Dept: OTHER | Age: 53
End: 2018-10-31

## 2018-11-14 ENCOUNTER — MYAURORA ACCOUNT LINK (OUTPATIENT)
Dept: OTHER | Age: 53
End: 2018-11-14

## 2018-11-14 ENCOUNTER — PRIOR ORIGINAL RECORDS (OUTPATIENT)
Dept: OTHER | Age: 53
End: 2018-11-14

## 2018-11-20 ENCOUNTER — PRIOR ORIGINAL RECORDS (OUTPATIENT)
Dept: OTHER | Age: 53
End: 2018-11-20

## 2019-02-28 VITALS
WEIGHT: 185 LBS | DIASTOLIC BLOOD PRESSURE: 60 MMHG | HEART RATE: 74 BPM | OXYGEN SATURATION: 97 % | BODY MASS INDEX: 29.73 KG/M2 | RESPIRATION RATE: 18 BRPM | HEIGHT: 66 IN | SYSTOLIC BLOOD PRESSURE: 110 MMHG

## 2019-02-28 VITALS
BODY MASS INDEX: 30.7 KG/M2 | HEART RATE: 75 BPM | HEIGHT: 66 IN | OXYGEN SATURATION: 98 % | WEIGHT: 191 LBS | SYSTOLIC BLOOD PRESSURE: 124 MMHG | DIASTOLIC BLOOD PRESSURE: 80 MMHG

## 2019-02-28 VITALS
HEART RATE: 64 BPM | SYSTOLIC BLOOD PRESSURE: 104 MMHG | BODY MASS INDEX: 28.77 KG/M2 | WEIGHT: 179 LBS | DIASTOLIC BLOOD PRESSURE: 60 MMHG | HEIGHT: 66 IN

## 2019-03-01 VITALS
DIASTOLIC BLOOD PRESSURE: 60 MMHG | WEIGHT: 185 LBS | HEIGHT: 66 IN | BODY MASS INDEX: 29.73 KG/M2 | HEART RATE: 72 BPM | SYSTOLIC BLOOD PRESSURE: 124 MMHG | OXYGEN SATURATION: 99 %

## 2019-04-17 RX ORDER — ROSUVASTATIN CALCIUM 5 MG/1
TABLET, COATED ORAL
COMMUNITY

## 2019-10-30 ENCOUNTER — APPOINTMENT (OUTPATIENT)
Dept: CARDIOLOGY | Age: 54
End: 2019-10-30

## 2023-07-28 ENCOUNTER — OFFICE VISIT (OUTPATIENT)
Dept: FAMILY MEDICINE CLINIC | Facility: CLINIC | Age: 58
End: 2023-07-28

## 2023-07-28 VITALS
HEART RATE: 71 BPM | BODY MASS INDEX: 32.3 KG/M2 | SYSTOLIC BLOOD PRESSURE: 135 MMHG | WEIGHT: 201 LBS | HEIGHT: 66 IN | DIASTOLIC BLOOD PRESSURE: 89 MMHG | OXYGEN SATURATION: 96 %

## 2023-07-28 DIAGNOSIS — Z00.00 WELL ADULT EXAM: Primary | ICD-10-CM

## 2023-07-28 DIAGNOSIS — E78.5 HYPERLIPIDEMIA, UNSPECIFIED HYPERLIPIDEMIA TYPE: ICD-10-CM

## 2023-07-28 DIAGNOSIS — I10 ESSENTIAL HYPERTENSION: ICD-10-CM

## 2023-07-28 DIAGNOSIS — N64.4 BREAST PAIN, LEFT: ICD-10-CM

## 2023-07-28 DIAGNOSIS — K21.9 GASTROESOPHAGEAL REFLUX DISEASE WITHOUT ESOPHAGITIS: ICD-10-CM

## 2023-07-28 PROCEDURE — 99396 PREV VISIT EST AGE 40-64: CPT | Performed by: NURSE PRACTITIONER

## 2023-07-28 PROCEDURE — 99204 OFFICE O/P NEW MOD 45 MIN: CPT | Performed by: NURSE PRACTITIONER

## 2023-07-28 PROCEDURE — 3075F SYST BP GE 130 - 139MM HG: CPT | Performed by: NURSE PRACTITIONER

## 2023-07-28 PROCEDURE — 3079F DIAST BP 80-89 MM HG: CPT | Performed by: NURSE PRACTITIONER

## 2023-07-28 PROCEDURE — 3008F BODY MASS INDEX DOCD: CPT | Performed by: NURSE PRACTITIONER

## 2023-07-28 RX ORDER — METOPROLOL SUCCINATE 50 MG/1
50 TABLET, EXTENDED RELEASE ORAL DAILY
COMMUNITY
Start: 2023-06-14

## 2023-07-28 RX ORDER — OMEPRAZOLE 20 MG/1
20 CAPSULE, DELAYED RELEASE ORAL DAILY
COMMUNITY
Start: 2023-05-12

## 2023-07-28 RX ORDER — ROSUVASTATIN CALCIUM 5 MG/1
5 TABLET, COATED ORAL NIGHTLY
COMMUNITY

## 2023-08-08 ENCOUNTER — MED REC SCAN ONLY (OUTPATIENT)
Dept: INTERNAL MEDICINE CLINIC | Facility: CLINIC | Age: 58
End: 2023-08-08

## 2023-08-08 ENCOUNTER — LAB ENCOUNTER (OUTPATIENT)
Dept: LAB | Age: 58
End: 2023-08-08
Attending: NURSE PRACTITIONER
Payer: COMMERCIAL

## 2023-08-08 DIAGNOSIS — Z00.00 WELL ADULT EXAM: ICD-10-CM

## 2023-08-08 DIAGNOSIS — R73.01 ELEVATED FASTING BLOOD SUGAR: ICD-10-CM

## 2023-08-08 DIAGNOSIS — R73.01 ELEVATED FASTING BLOOD SUGAR: Primary | ICD-10-CM

## 2023-08-08 LAB
ALBUMIN SERPL-MCNC: 4.1 G/DL (ref 3.4–5)
ALBUMIN/GLOB SERPL: 1.1 {RATIO} (ref 1–2)
ALP LIVER SERPL-CCNC: 92 U/L
ALT SERPL-CCNC: 52 U/L
ANION GAP SERPL CALC-SCNC: 3 MMOL/L (ref 0–18)
AST SERPL-CCNC: 37 U/L (ref 15–37)
BASOPHILS # BLD AUTO: 0.02 X10(3) UL (ref 0–0.2)
BASOPHILS NFR BLD AUTO: 0.3 %
BILIRUB SERPL-MCNC: 0.6 MG/DL (ref 0.1–2)
BUN BLD-MCNC: 13 MG/DL (ref 7–18)
CALCIUM BLD-MCNC: 9.1 MG/DL (ref 8.5–10.1)
CHLORIDE SERPL-SCNC: 107 MMOL/L (ref 98–112)
CHOLEST SERPL-MCNC: 165 MG/DL (ref ?–200)
CO2 SERPL-SCNC: 28 MMOL/L (ref 21–32)
CREAT BLD-MCNC: 0.83 MG/DL
EGFRCR SERPLBLD CKD-EPI 2021: 101 ML/MIN/1.73M2 (ref 60–?)
EOSINOPHIL # BLD AUTO: 0.12 X10(3) UL (ref 0–0.7)
EOSINOPHIL NFR BLD AUTO: 1.9 %
ERYTHROCYTE [DISTWIDTH] IN BLOOD BY AUTOMATED COUNT: 12.4 %
EST. AVERAGE GLUCOSE BLD GHB EST-MCNC: 134 MG/DL (ref 68–126)
FASTING PATIENT LIPID ANSWER: YES
FASTING STATUS PATIENT QL REPORTED: YES
GLOBULIN PLAS-MCNC: 3.6 G/DL (ref 2.8–4.4)
GLUCOSE BLD-MCNC: 113 MG/DL (ref 70–99)
HBA1C MFR BLD: 6.3 % (ref ?–5.7)
HCT VFR BLD AUTO: 48 %
HDLC SERPL-MCNC: 48 MG/DL (ref 40–59)
HGB BLD-MCNC: 16 G/DL
IMM GRANULOCYTES # BLD AUTO: 0.01 X10(3) UL (ref 0–1)
IMM GRANULOCYTES NFR BLD: 0.2 %
LDLC SERPL CALC-MCNC: 98 MG/DL (ref ?–100)
LYMPHOCYTES # BLD AUTO: 2.21 X10(3) UL (ref 1–4)
LYMPHOCYTES NFR BLD AUTO: 34.5 %
MCH RBC QN AUTO: 30.1 PG (ref 26–34)
MCHC RBC AUTO-ENTMCNC: 33.3 G/DL (ref 31–37)
MCV RBC AUTO: 90.4 FL
MONOCYTES # BLD AUTO: 0.49 X10(3) UL (ref 0.1–1)
MONOCYTES NFR BLD AUTO: 7.7 %
NEUTROPHILS # BLD AUTO: 3.55 X10 (3) UL (ref 1.5–7.7)
NEUTROPHILS # BLD AUTO: 3.55 X10(3) UL (ref 1.5–7.7)
NEUTROPHILS NFR BLD AUTO: 55.4 %
NONHDLC SERPL-MCNC: 117 MG/DL (ref ?–130)
OSMOLALITY SERPL CALC.SUM OF ELEC: 287 MOSM/KG (ref 275–295)
PLATELET # BLD AUTO: 278 10(3)UL (ref 150–450)
POTASSIUM SERPL-SCNC: 4.1 MMOL/L (ref 3.5–5.1)
PROT SERPL-MCNC: 7.7 G/DL (ref 6.4–8.2)
RBC # BLD AUTO: 5.31 X10(6)UL
SODIUM SERPL-SCNC: 138 MMOL/L (ref 136–145)
TRIGL SERPL-MCNC: 104 MG/DL (ref 30–149)
TSI SER-ACNC: 2.19 MIU/ML (ref 0.36–3.74)
VLDLC SERPL CALC-MCNC: 17 MG/DL (ref 0–30)
WBC # BLD AUTO: 6.4 X10(3) UL (ref 4–11)

## 2023-08-08 PROCEDURE — 85025 COMPLETE CBC W/AUTO DIFF WBC: CPT

## 2023-08-08 PROCEDURE — 80053 COMPREHEN METABOLIC PANEL: CPT

## 2023-08-08 PROCEDURE — 36415 COLL VENOUS BLD VENIPUNCTURE: CPT

## 2023-08-08 PROCEDURE — 83036 HEMOGLOBIN GLYCOSYLATED A1C: CPT

## 2023-08-08 PROCEDURE — 80061 LIPID PANEL: CPT

## 2023-08-08 PROCEDURE — 84443 ASSAY THYROID STIM HORMONE: CPT

## 2023-08-09 ENCOUNTER — TELEPHONE (OUTPATIENT)
Dept: FAMILY MEDICINE CLINIC | Facility: CLINIC | Age: 58
End: 2023-08-09

## 2023-08-09 DIAGNOSIS — N64.4 BREAST PAIN, LEFT: Primary | ICD-10-CM

## 2023-08-09 NOTE — TELEPHONE ENCOUNTER
Dahlia at Radiology requesting to get a Sarahi zulma diagnostic bilateral with ultrasound. Patient has Left breast pain per order.

## 2023-08-16 ENCOUNTER — HOSPITAL ENCOUNTER (OUTPATIENT)
Dept: MAMMOGRAPHY | Facility: HOSPITAL | Age: 58
Discharge: HOME | End: 2023-08-16
Attending: NURSE PRACTITIONER
Payer: COMMERCIAL

## 2023-08-16 ENCOUNTER — HOSPITAL ENCOUNTER (OUTPATIENT)
Dept: ULTRASOUND IMAGING | Facility: HOSPITAL | Age: 58
Discharge: HOME | End: 2023-08-16
Attending: NURSE PRACTITIONER
Payer: COMMERCIAL

## 2023-08-16 DIAGNOSIS — N64.4 BREAST PAIN, LEFT: ICD-10-CM

## 2023-08-16 PROCEDURE — 76642 ULTRASOUND BREAST LIMITED: CPT | Performed by: NURSE PRACTITIONER

## 2023-08-16 PROCEDURE — 77066 DX MAMMO INCL CAD BI: CPT | Performed by: NURSE PRACTITIONER

## 2023-08-16 PROCEDURE — 77062 BREAST TOMOSYNTHESIS BI: CPT | Performed by: NURSE PRACTITIONER

## 2023-09-18 ENCOUNTER — LAB ENCOUNTER (OUTPATIENT)
Dept: LAB | Age: 58
End: 2023-09-18
Attending: FAMILY MEDICINE
Payer: COMMERCIAL

## 2023-09-18 ENCOUNTER — OFFICE VISIT (OUTPATIENT)
Dept: FAMILY MEDICINE CLINIC | Facility: CLINIC | Age: 58
End: 2023-09-18

## 2023-09-18 VITALS
HEART RATE: 99 BPM | HEIGHT: 66 IN | DIASTOLIC BLOOD PRESSURE: 82 MMHG | RESPIRATION RATE: 17 BRPM | WEIGHT: 202.69 LBS | SYSTOLIC BLOOD PRESSURE: 137 MMHG | BODY MASS INDEX: 32.58 KG/M2

## 2023-09-18 DIAGNOSIS — L29.9 SCALP ITCH: ICD-10-CM

## 2023-09-18 DIAGNOSIS — R07.2 PRECORDIAL PAIN: ICD-10-CM

## 2023-09-18 DIAGNOSIS — R07.2 PRECORDIAL PAIN: Primary | ICD-10-CM

## 2023-09-18 LAB
CHOLEST SERPL-MCNC: 160 MG/DL (ref ?–200)
FASTING PATIENT LIPID ANSWER: YES
HDLC SERPL-MCNC: 45 MG/DL (ref 40–59)
LDLC SERPL CALC-MCNC: 75 MG/DL (ref ?–100)
NONHDLC SERPL-MCNC: 115 MG/DL (ref ?–130)
TRIGL SERPL-MCNC: 243 MG/DL (ref 30–149)
VLDLC SERPL CALC-MCNC: 38 MG/DL (ref 0–30)

## 2023-09-18 PROCEDURE — 36415 COLL VENOUS BLD VENIPUNCTURE: CPT

## 2023-09-18 PROCEDURE — 80061 LIPID PANEL: CPT

## 2023-09-18 RX ORDER — ROSUVASTATIN CALCIUM 5 MG/1
5 TABLET, COATED ORAL NIGHTLY
Qty: 90 TABLET | Refills: 0 | Status: SHIPPED | OUTPATIENT
Start: 2023-09-18

## 2023-09-27 ENCOUNTER — OFFICE VISIT (OUTPATIENT)
Dept: DERMATOLOGY CLINIC | Facility: CLINIC | Age: 58
End: 2023-09-27

## 2023-09-27 DIAGNOSIS — L73.0 ACNE KELOIDALIS NUCHAE: Primary | ICD-10-CM

## 2023-09-27 DIAGNOSIS — L66.1 LICHEN PLANOPILARIS: ICD-10-CM

## 2023-09-27 PROCEDURE — 11900 INJECT SKIN LESIONS </W 7: CPT | Performed by: STUDENT IN AN ORGANIZED HEALTH CARE EDUCATION/TRAINING PROGRAM

## 2023-09-27 PROCEDURE — 99204 OFFICE O/P NEW MOD 45 MIN: CPT | Performed by: STUDENT IN AN ORGANIZED HEALTH CARE EDUCATION/TRAINING PROGRAM

## 2023-09-27 RX ORDER — MINOXIDIL 2.5 MG/1
2.5 TABLET ORAL DAILY
Qty: 180 TABLET | Refills: 0 | Status: SHIPPED | OUTPATIENT
Start: 2023-09-27

## 2023-09-27 RX ORDER — CLOBETASOL PROPIONATE 0.46 MG/ML
SOLUTION TOPICAL
Qty: 50 ML | Refills: 6 | Status: SHIPPED | OUTPATIENT
Start: 2023-09-27

## 2023-09-27 RX ORDER — KETOCONAZOLE 20 MG/ML
SHAMPOO TOPICAL
Qty: 120 ML | Refills: 11 | Status: SHIPPED | OUTPATIENT
Start: 2023-09-27

## 2023-09-27 NOTE — PROGRESS NOTES
September 27, 2023    New patient     CHIEF COMPLAINT: scalp rash    HISTORY OF PRESENT ILLNESS: .    1. Rash  Location: scalp   Duration: over 30 years  Signs and symptoms: itchy and dry  Current treatment: none  Past treatments: medication shampoo, topical lotions, and injections      DERM HISTORY:  History of skin cancer: No  History of chronic skin disease/condition: No    FAMILY HISTORY:  History of melanoma: No  History of chronic skin disease/condition: No    History/Other:    REVIEW OF SYSTEMS:  Constitutional: Denies fever, chills, unintentional weight loss. Skin as per HPI    PAST MEDICAL HISTORY:  Past Medical History:   Diagnosis Date    BPH (benign prostatic hyperplasia)     Coronary atherosclerosis     Disorder of liver     fatty liver    Esophageal reflux     H/O right inguinal hernia repair     High cholesterol     Hyperlipidemia     PONV (postoperative nausea and vomiting)     Visual impairment        Medications  Current Outpatient Medications   Medication Sig Dispense Refill    diclofenac 1 % External Gel Apply 2 g topically 4 (four) times daily. 100 g 1    rosuvastatin 5 MG Oral Tab Take 1 tablet (5 mg total) by mouth nightly. 90 tablet 0    metoprolol succinate ER 50 MG Oral Tablet 24 Hr Take 1 tablet (50 mg total) by mouth daily. omeprazole 20 MG Oral Capsule Delayed Release Take 1 capsule (20 mg total) by mouth daily. aspirin 81 MG Oral Tab Take 1 tablet (81 mg total) by mouth daily. 30 tablet 5       Objective:    PHYSICAL EXAM:  General: awake, alert, no acute distress  Skin: Skin exam was performed today including the following: scalp. Pertinent findings include:   - occipital scalp with sclerotic plaques and perifollicular scaling    ASSESSMENT & PLAN:  Pathophysiology of diagnoses discussed with patient. Therapeutic options reviewed. Risks, benefits, and alternatives discussed with patient. Instructions reviewed at length.     #Acne keloidalis nuchae, burnt out  #Lichen plano pilaris with active perifollicular scaling  - Recommended intralesional Kenalog injections today. Risk benefits, alternatives discussed. Patient agreeable.  -Start clobetasol 0.05% solution to affected areas of scalp twice daily Monday through Friday. Take weekends off.  - Start ketoconazole 2% shampoo. Use 2-3 times weekly. Lather in and leave on for 3 to 5 minutes before washing off.  - Discussed solutions to through your suppression of lichen plano pilaris such as doxycycline twice daily and Plaquenil twice daily. Patient opts to continue with intralesional therapy and topical therapy at this time. Discussed oral minoxidil and possibility of regrowth. Patient interested in and wishing to proceed. - Start oral minoxidil 2.5 mg daily (half a tablet) with food  - Discussed medication usage, dosage, risks, benefits and side effects. ADR discusssed, including, but not limited to:  hypotension, tachycardia, pericardial effusion, edema, and increased facial/body hirsutism. - Intralesional Kenalog (triamcinolone) injection today     Concentration: 5 mg/mL  Site: occipital scalp  Total volume injected: 1.5    Patient was counseled on the possible side effects of injection: pain at site, infection, atrophy of skin, systemic absorption, hypopigmentation, loss of hair. Questions if any were addressed before proceeding with prep of the site with rubbing alcohol. Kenalog was injected with sterile syringe and 25 g needle. Patient tolerated well.      Return to clinic: 3 months or sooner if something concerning arises     Phillip Randall MD

## 2023-11-15 ENCOUNTER — HOSPITAL ENCOUNTER (OUTPATIENT)
Age: 58
Discharge: HOME | End: 2023-11-15
Payer: COMMERCIAL

## 2023-11-15 ENCOUNTER — NURSE TRIAGE (OUTPATIENT)
Dept: FAMILY MEDICINE CLINIC | Facility: CLINIC | Age: 58
End: 2023-11-15

## 2023-11-15 VITALS
TEMPERATURE: 98 F | HEART RATE: 79 BPM | RESPIRATION RATE: 17 BRPM | DIASTOLIC BLOOD PRESSURE: 97 MMHG | SYSTOLIC BLOOD PRESSURE: 147 MMHG | OXYGEN SATURATION: 100 %

## 2023-11-15 DIAGNOSIS — J06.9 VIRAL URI WITH COUGH: Primary | ICD-10-CM

## 2023-11-15 LAB
POCT INFLUENZA A: NEGATIVE
POCT INFLUENZA B: NEGATIVE

## 2023-11-15 RX ORDER — BENZONATATE 100 MG/1
100 CAPSULE ORAL 3 TIMES DAILY PRN
Qty: 30 CAPSULE | Refills: 0 | Status: SHIPPED | OUTPATIENT
Start: 2023-11-15 | End: 2023-12-15

## 2023-11-15 NOTE — DISCHARGE INSTRUCTIONS
Please push fluids and make sure you are staying hydrated. Cool-mist humidifier at night. Steam shower prior to going to bed at night. Close follow-up with your primary care provider is recommended. Any shortness of breath, chest pain or worsening symptoms please go to the ER.

## 2023-11-15 NOTE — TELEPHONE ENCOUNTER
Action Requested: Summary for Provider     []  Critical Lab, Recommendations Needed  [] Need Additional Advice  []   FYI    []   Need Orders  [] Need Medications Sent to Pharmacy  []  Other     SUMMARY: Daughter called (not on RAMIRO), verified patient's Name and . She reports patient has bad colds, mild cough, body aches, and chills that started 2 days ago. Patient tested negative for Covid yesterday. He has been taking TheraFlu. Denies any other complaints at this time. Requesting for an appointment to be seen, however, appointments available only later today. Advised to go to [facility] for evaluation and treatment. [address] clinic locations; she will also check the webside. Care advice given per protocol. Verbalized understanding and agreed with plan of care.     Reason for call: Cold  Onset:     Reason for Disposition   Patient wants to be seen    Protocols used: Common Cold-A-[place]

## 2023-12-14 RX ORDER — ROSUVASTATIN CALCIUM 5 MG/1
5 TABLET, COATED ORAL NIGHTLY
Qty: 90 TABLET | Refills: 2 | Status: SHIPPED | OUTPATIENT
Start: 2023-12-14

## 2023-12-14 NOTE — TELEPHONE ENCOUNTER
Refill passed per [place] [facility], [facility] protocol.     Requested Prescriptions   Pending Prescriptions Disp Refills    ROSUVASTATIN 5 MG Oral Tab [Pharmacy Med Name: ROSUVASTATIN CALCIUM 5 MG TAB] 90 tablet 0     Sig: TAKE 1 TABLET BY MOUTH EVERY DAY AT NIGHT       Cholesterol Medication Protocol Passed - 12/14/2023 12:02 AM        Passed - ALT in past 12 months        Passed - LDL in past 12 months        Passed - Last ALT < 80     Lab Results   Component Value Date    ALT 52 08/08/2023             Passed - Last LDL < 130     Lab Results   Component Value Date    LDL 75 09/18/2023             Passed - In person appointment or virtual visit in the past 12 mos or appointment in next 3 mos     Recent Outpatient Visits              2 months ago Acne keloidalis nuchae    [facility], [address] [facility] Dalila Herron MD    Office Visit    2 months ago Precordial pain    [facility], [place] Domenick Pin, MD    Office Visit    4 months ago Well adult exam    [address] [facility], [address] [facility], [facility]    Office Visit    6 years ago [facility] of submandibular Janalyn Renny Boogie Rheba Madrid, MD    Office Visit    6 years ago Testicular pain, left    [facility], [address], Remedios Daily MD    Office Visit                           Recent Outpatient Visits              2 months ago Acne keloidalis nuchae    [address] [facility], Perico Herron MD    Office Visit    2 months ago Precordial pain    [facility], [facility], [place] Domenick Pin, MD    Office Visit    4 months ago Well adult exam    [address] [facility], [address] [facility], APRN    Office Visit    6 years ago Sialoadenitis of submandibular gland    [address], [address], Louis Murphy Boogie Bae MD    Office Visit    6 years ago Testicular pain, left    [address] [place], [address] [facility], Eladio Vazquez MD    Office Visit

## 2024-02-09 ENCOUNTER — OFFICE VISIT (OUTPATIENT)
Dept: FAMILY MEDICINE CLINIC | Facility: CLINIC | Age: 59
End: 2024-02-09
Payer: COMMERCIAL

## 2024-02-09 VITALS
HEIGHT: 66 IN | HEART RATE: 65 BPM | SYSTOLIC BLOOD PRESSURE: 130 MMHG | WEIGHT: 209 LBS | DIASTOLIC BLOOD PRESSURE: 83 MMHG | BODY MASS INDEX: 33.59 KG/M2

## 2024-02-09 DIAGNOSIS — E78.5 HYPERLIPIDEMIA, UNSPECIFIED HYPERLIPIDEMIA TYPE: ICD-10-CM

## 2024-02-09 DIAGNOSIS — I10 ESSENTIAL HYPERTENSION: ICD-10-CM

## 2024-02-09 DIAGNOSIS — R60.0 LOWER LEG EDEMA: Primary | ICD-10-CM

## 2024-02-09 DIAGNOSIS — R73.03 PREDIABETES: ICD-10-CM

## 2024-02-09 PROCEDURE — 3079F DIAST BP 80-89 MM HG: CPT | Performed by: NURSE PRACTITIONER

## 2024-02-09 PROCEDURE — 3008F BODY MASS INDEX DOCD: CPT | Performed by: NURSE PRACTITIONER

## 2024-02-09 PROCEDURE — 3075F SYST BP GE 130 - 139MM HG: CPT | Performed by: NURSE PRACTITIONER

## 2024-02-09 PROCEDURE — 99213 OFFICE O/P EST LOW 20 MIN: CPT | Performed by: NURSE PRACTITIONER

## 2024-02-09 NOTE — PROGRESS NOTES
HPI    Patient presents for bilateral foot swelling for the past few days.  Stands a lot for work.  Denies pain, shortness of breath and chest pain.  Admits to not drinking enough water.  With a history of prediabetes, hypertension and hyperlipidemia.  Last set of labs in August A1c was 6.3.      Review of Systems   Cardiovascular:  Positive for leg swelling.        Vitals:    02/09/24 0947   BP: 130/83   Pulse: 65   Weight: 209 lb (94.8 kg)   Height: 5' 6\" (1.676 m)     Body mass index is 33.73 kg/m².    Health Maintenance   Topic Date Due    Colorectal Cancer Screening  Never done    DTaP,Tdap,and Td Vaccines (1 - Tdap) Never done    Zoster Vaccines (1 of 2) Never done    PSA  07/17/2019    COVID-19 Vaccine (4 - 2023-24 season) 09/01/2023    Influenza Vaccine (1) Never done    Annual Depression Screening  01/01/2024    Annual Physical  07/28/2024    Pneumococcal Vaccine: Birth to 64yrs  Aged Out       Past Medical History:   Diagnosis Date    BPH (benign prostatic hyperplasia)     Coronary atherosclerosis     Disorder of liver     fatty liver    Esophageal reflux     H/O right inguinal hernia repair     High cholesterol     Hyperlipidemia     PONV (postoperative nausea and vomiting)     Visual impairment        .  Past Surgical History:   Procedure Laterality Date    Cholecystectomy      Laparoscopic     Hernia surgery Right     inguinal hernia    Vasectomy      Vasectomy         Family History   Problem Relation Age of Onset    Diabetes Father     Other (Other) Mother         cirrhosis of the liver    Diabetes Brother     Other (No cancer-prostate or ) Other     Other (No urolithiasis) Other        Social History     Socioeconomic History    Marital status:      Spouse name: Not on file    Number of children: 2    Years of education: Not on file    Highest education level: Not on file   Occupational History    Occupation:      Comment: full time   Tobacco Use    Smoking status: Former     Types:  Cigarettes    Smokeless tobacco: Never    Tobacco comments:     1 unit per day, 20 years, 20 unit years, year quit 1990 pr NG   Substance and Sexual Activity    Alcohol use: Yes     Comment: socially    Drug use: No    Sexual activity: Not on file   Other Topics Concern     Service Not Asked    Blood Transfusions Not Asked    Caffeine Concern Yes     Comment: Coffee, soda    Occupational Exposure Not Asked    Hobby Hazards Not Asked    Sleep Concern Not Asked    Stress Concern Not Asked    Weight Concern Not Asked    Special Diet Not Asked    Back Care Not Asked    Exercise Not Asked    Bike Helmet Not Asked    Seat Belt Not Asked    Self-Exams Not Asked   Social History Narrative    Not on file     Social Determinants of Health     Financial Resource Strain: Not on file   Food Insecurity: Not on file   Transportation Needs: Not on file   Physical Activity: Not on file   Stress: Not on file   Social Connections: Not on file   Housing Stability: Not on file       Current Outpatient Medications   Medication Sig Dispense Refill    rosuvastatin 5 MG Oral Tab Take 1 tablet (5 mg total) by mouth nightly. 90 tablet 2    minoxidil 2.5 MG Oral Tab Take 1 tablet (2.5 mg total) by mouth daily. 180 tablet 0    diclofenac 1 % External Gel Apply 2 g topically 4 (four) times daily. 100 g 1    metoprolol succinate ER 50 MG Oral Tablet 24 Hr Take 1 tablet (50 mg total) by mouth daily.      omeprazole 20 MG Oral Capsule Delayed Release Take 1 capsule (20 mg total) by mouth daily.      aspirin 81 MG Oral Tab Take 1 tablet (81 mg total) by mouth daily. 30 tablet 5       Allergies:  No Known Allergies    Physical Exam  Vitals and nursing note reviewed.   Constitutional:       General: He is not in acute distress.     Appearance: Normal appearance. He is not ill-appearing.   HENT:      Head: Normocephalic and atraumatic.   Cardiovascular:      Rate and Rhythm: Normal rate and regular rhythm.      Heart sounds: Normal heart  sounds.   Pulmonary:      Effort: Pulmonary effort is normal. No respiratory distress.      Breath sounds: Normal breath sounds. No stridor. No wheezing, rhonchi or rales.   Chest:      Chest wall: No tenderness.   Musculoskeletal:      Right lower le+ Edema present.      Left lower le+ Edema present.   Skin:     General: Skin is dry.   Neurological:      Mental Status: He is alert and oriented to person, place, and time.   Psychiatric:         Mood and Affect: Mood normal.         Behavior: Behavior normal.         Thought Content: Thought content normal.         Judgment: Judgment normal.          Assessment and Plan:   Problem List Items Addressed This Visit    None  Visit Diagnoses       Lower leg edema    -  Primary    Relevant Orders    Comp Metabolic Panel (14)    Essential hypertension        Relevant Orders    Comp Metabolic Panel (14)    Hyperlipidemia, unspecified hyperlipidemia type        Relevant Orders    Lipid Panel    Prediabetes        Relevant Orders    Hemoglobin A1C           To repeat labs.  Supportive care discussed for lower leg edema.  If no relief or worsening, to follow up and consider diuretic.        Discussed plan of care with patient and patient is in agreement.  All questions answered. Patient to call with questions or concerns.    Encouraged to sign up for My Chart if not already registered.

## 2024-02-10 ENCOUNTER — LAB ENCOUNTER (OUTPATIENT)
Dept: LAB | Age: 59
End: 2024-02-10
Attending: NURSE PRACTITIONER
Payer: COMMERCIAL

## 2024-02-10 DIAGNOSIS — R60.0 LOWER LEG EDEMA: ICD-10-CM

## 2024-02-10 DIAGNOSIS — I10 ESSENTIAL HYPERTENSION: ICD-10-CM

## 2024-02-10 DIAGNOSIS — E78.5 HYPERLIPIDEMIA, UNSPECIFIED HYPERLIPIDEMIA TYPE: ICD-10-CM

## 2024-02-10 DIAGNOSIS — R73.03 PREDIABETES: ICD-10-CM

## 2024-02-10 LAB
ALBUMIN SERPL-MCNC: 4.3 G/DL (ref 3.2–4.8)
ALBUMIN/GLOB SERPL: 1.4 {RATIO} (ref 1–2)
ALP LIVER SERPL-CCNC: 91 U/L
ALT SERPL-CCNC: 36 U/L
ANION GAP SERPL CALC-SCNC: 7 MMOL/L (ref 0–18)
AST SERPL-CCNC: 35 U/L (ref ?–34)
BILIRUB SERPL-MCNC: 0.8 MG/DL (ref 0.3–1.2)
BUN BLD-MCNC: 9 MG/DL (ref 9–23)
BUN/CREAT SERPL: 11.5 (ref 10–20)
CALCIUM BLD-MCNC: 9.1 MG/DL (ref 8.7–10.4)
CHLORIDE SERPL-SCNC: 105 MMOL/L (ref 98–112)
CHOLEST SERPL-MCNC: 149 MG/DL (ref ?–200)
CO2 SERPL-SCNC: 27 MMOL/L (ref 21–32)
CREAT BLD-MCNC: 0.78 MG/DL
EGFRCR SERPLBLD CKD-EPI 2021: 103 ML/MIN/1.73M2 (ref 60–?)
EST. AVERAGE GLUCOSE BLD GHB EST-MCNC: 123 MG/DL (ref 68–126)
FASTING PATIENT LIPID ANSWER: YES
FASTING STATUS PATIENT QL REPORTED: YES
GLOBULIN PLAS-MCNC: 3 G/DL (ref 2.8–4.4)
GLUCOSE BLD-MCNC: 95 MG/DL (ref 70–99)
HBA1C MFR BLD: 5.9 % (ref ?–5.7)
HDLC SERPL-MCNC: 42 MG/DL (ref 40–59)
LDLC SERPL CALC-MCNC: 87 MG/DL (ref ?–100)
NONHDLC SERPL-MCNC: 107 MG/DL (ref ?–130)
OSMOLALITY SERPL CALC.SUM OF ELEC: 286 MOSM/KG (ref 275–295)
POTASSIUM SERPL-SCNC: 4.3 MMOL/L (ref 3.5–5.1)
PROT SERPL-MCNC: 7.3 G/DL (ref 5.7–8.2)
SODIUM SERPL-SCNC: 139 MMOL/L (ref 136–145)
TRIGL SERPL-MCNC: 106 MG/DL (ref 30–149)
VLDLC SERPL CALC-MCNC: 17 MG/DL (ref 0–30)

## 2024-02-10 PROCEDURE — 80053 COMPREHEN METABOLIC PANEL: CPT

## 2024-02-10 PROCEDURE — 80061 LIPID PANEL: CPT

## 2024-02-10 PROCEDURE — 83036 HEMOGLOBIN GLYCOSYLATED A1C: CPT

## 2024-02-10 PROCEDURE — 36415 COLL VENOUS BLD VENIPUNCTURE: CPT

## 2024-02-12 DIAGNOSIS — R73.03 PREDIABETES: Primary | ICD-10-CM

## 2024-02-12 DIAGNOSIS — E78.5 HYPERLIPIDEMIA, UNSPECIFIED HYPERLIPIDEMIA TYPE: ICD-10-CM

## 2024-02-14 ENCOUNTER — ORDER TRANSCRIPTION (OUTPATIENT)
Dept: ADMINISTRATIVE | Facility: HOSPITAL | Age: 59
End: 2024-02-14

## 2024-02-14 DIAGNOSIS — R73.03 PRE-DIABETES: Primary | ICD-10-CM

## 2024-02-14 DIAGNOSIS — E78.5 HYPERLIPIDEMIA, UNSPECIFIED HYPERLIPIDEMIA TYPE: ICD-10-CM

## 2024-03-07 ENCOUNTER — HOSPITAL ENCOUNTER (OUTPATIENT)
Dept: NUTRITION | Facility: HOSPITAL | Age: 59
Discharge: HOME | End: 2024-03-07
Attending: NURSE PRACTITIONER
Payer: COMMERCIAL

## 2024-03-07 DIAGNOSIS — E78.5 HYPERLIPIDEMIA, UNSPECIFIED HYPERLIPIDEMIA TYPE: ICD-10-CM

## 2024-03-07 DIAGNOSIS — R73.03 PRE-DIABETES: Primary | ICD-10-CM

## 2024-03-07 DIAGNOSIS — R73.03 PREDIABETES: ICD-10-CM

## 2024-03-07 PROCEDURE — 97802 MEDICAL NUTRITION INDIV IN: CPT

## 2024-03-07 NOTE — PROGRESS NOTES
Nutrition Assessment    Rui Saravia is a 58 year old male.    Referred by: Attending  Referring Physician Name: Amna Barajas    Assessment     Medical Nutrition Therapy Comment: prediabetes, hyperlipidemia  Visit Information: Initial Visit 3/7/2024  Spent 60minutes with patient    ANTHROPOMETRICS  HT: 167.6 cm   WT: 89 kg  IBW: 64.5 kg/142 lbs  138% IBW  BMI: 31.6  BMI CLASSIFICATION: 30-34.9 kg/m2 - obesity class I      SIGNIFICANT MEDICAL Hx  Significant Past Medical Hx: prediabetes, hyperlipidemia, fatty liver  Pertinent Medications:   Current Outpatient Medications:     rosuvastatin 5 MG Oral Tab, Take 1 tablet (5 mg total) by mouth nightly., Disp: 90 tablet, Rfl: 2    minoxidil 2.5 MG Oral Tab, Take 1 tablet (2.5 mg total) by mouth daily., Disp: 180 tablet, Rfl: 0    diclofenac 1 % External Gel, Apply 2 g topically 4 (four) times daily., Disp: 100 g, Rfl: 1    metoprolol succinate ER 50 MG Oral Tablet 24 Hr, Take 1 tablet (50 mg total) by mouth daily., Disp: , Rfl:     omeprazole 20 MG Oral Capsule Delayed Release, Take 1 capsule (20 mg total) by mouth daily., Disp: , Rfl:     aspirin 81 MG Oral Tab, Take 1 tablet (81 mg total) by mouth daily., Disp: 30 tablet, Rfl: 5  Pertinent Lab Results: Hgb A1C 5.9%    DIET HISTORY  Number of meals per day: 3  Number of snacks per day:  none  Number of meals away from home (per week):  none currently  Comment: Rui here after getting recent blood work back and having continued elevation in hgb A1C.  Since blood work for about 1 month he has changed how he is eating and has lost ~8 pounds .  He is small breakfast of grape nut cereal with almond milk and berries about 4 hours after waking. His lunch is at 1 pm and he has been eating a salad with other veggies and about 1 oz of chicken and then dinner is early for him after work at 3:30pm some type of protein, avocado and vegetables and later in the evening he will feel hungry but will drink Chamomile tea. We discussed  he has made some nice changes to more whole foods but likely not adequate to meet nutrition needs and support his metabolism.  We discussed eating 3 meals per day with all/most of food groups using myplate as reference and 2 snacks per day with fruit /vegetable and protein/fat. We discussed healthy meal planning with the Mediterranean diet      PHYSICAL ACTIVITY  Type: other: none  Duration: 0 minutes  Frequency  Physical Assessment: He is planning to join a gym and increase physical activity.  Encouraged him to increase physical activity to 30-60minutes per day     Nutrition Diagnosis:  abnormal blood work related excessive intake as evident by elevated hgb A1c of 5.9%     Intervention     Nutrition Education: Recommended Modification  Nutrition/Diet Handouts Given: Other Handouts Provided: MyPlate, healthy snack and meals, Mediterranean diet handout       NUTRITION PRESCRIPTION:      Needs:  1800 Calorie     120 grams  Protein      Oral Diet Prescription: General (TAMIE)      Goals     Nutrition Goals:   Eat 3 meals and 2 snacks per day using MyPlate as guide to meal planning.  ~30 grams protein at each meal and 10 grams protein at snacks. Increase physical activity daily and strength training 3 times per week.    Recommendation to MD: none    Assessment of Ability/Barriers     Patient and/or Family Will: Verbalize Understanding    Patient and/or Family Ability to Learn: Retain Information    Readiness to Learn: Motivated    Barriers to Learning: None      3/7/2024  Nory Stevenson RD

## 2024-03-28 RX ORDER — MINOXIDIL 2.5 MG/1
2.5 TABLET ORAL DAILY
Qty: 90 TABLET | Refills: 1 | OUTPATIENT
Start: 2024-03-28

## 2024-03-28 NOTE — TELEPHONE ENCOUNTER
Refill request has failed the Ambulatory Medication Refill Standing Order and is routed to the primary physician to review the following:    Requested Prescriptions     Refused Prescriptions Disp Refills    MINOXIDIL 2.5 MG Oral Tab [Pharmacy Med Name: MINOXIDIL 2.5 MG TABLET] 90 tablet 1     Sig: TAKE 1 TABLET BY MOUTH EVERY DAY     Refused By: ADAN LERMA     Reason for Refusal: Appt required, please call patient

## 2024-04-07 ENCOUNTER — NURSE TRIAGE (OUTPATIENT)
Dept: FAMILY MEDICINE CLINIC | Facility: CLINIC | Age: 59
End: 2024-04-07

## 2024-04-08 ENCOUNTER — EKG ENCOUNTER (OUTPATIENT)
Dept: LAB | Age: 59
End: 2024-04-08
Attending: FAMILY MEDICINE
Payer: COMMERCIAL

## 2024-04-08 ENCOUNTER — OFFICE VISIT (OUTPATIENT)
Dept: FAMILY MEDICINE CLINIC | Facility: CLINIC | Age: 59
End: 2024-04-08

## 2024-04-08 VITALS
WEIGHT: 190.81 LBS | HEART RATE: 108 BPM | HEIGHT: 66 IN | DIASTOLIC BLOOD PRESSURE: 79 MMHG | SYSTOLIC BLOOD PRESSURE: 128 MMHG | BODY MASS INDEX: 30.67 KG/M2

## 2024-04-08 DIAGNOSIS — R07.9 CHEST PAIN, UNSPECIFIED TYPE: ICD-10-CM

## 2024-04-08 DIAGNOSIS — R07.9 CHEST PAIN, UNSPECIFIED TYPE: Primary | ICD-10-CM

## 2024-04-08 LAB
ATRIAL RATE: 80 BPM
P AXIS: 29 DEGREES
P-R INTERVAL: 170 MS
Q-T INTERVAL: 350 MS
QRS DURATION: 92 MS
QTC CALCULATION (BEZET): 403 MS
R AXIS: -16 DEGREES
T AXIS: 2 DEGREES
VENTRICULAR RATE: 80 BPM

## 2024-04-08 PROCEDURE — 93005 ELECTROCARDIOGRAM TRACING: CPT

## 2024-04-08 PROCEDURE — 93010 ELECTROCARDIOGRAM REPORT: CPT | Performed by: INTERNAL MEDICINE

## 2024-04-08 NOTE — TELEPHONE ENCOUNTER
Action Requested: Summary for Provider     []  Critical Lab, Recommendations Needed  [] Need Additional Advice  []   FYI    []   Need Orders  [] Need Medications Sent to Pharmacy  []  Other     SUMMARY: Patient having on and off chest pain for the last few weeks. No pain or symptoms today. States sometimes he does get lightheaded but he has also been taking cold medication so he thinks its more when he takes that that he feels this. No fever. Had pain in left chest, did mammogram. Says this pain is different. No SOB. He takes omeprzole daily, has hernia which he is aware of.     Patient scheduled for visit today for eval.     Future Appointments   Date Time Provider Department Center   4/8/2024 11:45 AM Kenny Hooper MD [facility]   4/12/2024  9:40 AM Amna Barajas APRN [facility]     Reason for call: chest pain   Onset: on and off for weeks       Reason for Disposition   All other patients with chest pain (Exception: fleeting chest pain lasting a few seconds)    Protocols used: Chest Pain-A-[place]

## 2024-04-08 NOTE — PROGRESS NOTES
4/8/2024  12:03 PM    Rui Saravia is a 58 year old male.    Chief complaint(s):   Chief Complaint   Patient presents with    Chest Pain     Center of chest that's painful and unable to breath, has been feeling dizzy, lightheaded and headaches      HPI:     Rui Saravia primary complaint is regarding chest pains.       Rui Saravia is a 58 year old male who presents for evaluation of chest pain Onset was 2 years ago, with unchanged course since that time.  The patient admits to chest discomfort that is intermittent, located in the left chest or anterior chest. with radiation to none, rated as a scale of 8/10 in intensity that is sharp, pressure-like in nature. Presently w/out pain.  Associated symptoms are dyspnea. Aggravating factors are none.  Alleviating factors are: none. Patient's cardiac risk factors are dyslipidemia, family history of premature cardiovascular disease, hypertension, male gender, and sedentary lifestyle.  Previous cardiac testing includes: electrocardiogram (ECG), exercise echo all normal, and exercise stress test normal .      HISTORY:  Past Medical History:   Diagnosis Date    BPH (benign prostatic hyperplasia)     Coronary atherosclerosis     Disorder of liver     fatty liver    Esophageal reflux     H/O right inguinal hernia repair     High cholesterol     Hyperlipidemia     PONV (postoperative nausea and vomiting)     Visual impairment       Past Surgical History:   Procedure Laterality Date    CHOLECYSTECTOMY      Laparoscopic     HERNIA SURGERY Right     inguinal hernia    VASECTOMY      VASECTOMY        Family History   Problem Relation Age of Onset    Diabetes Father     Other (Other) Mother         cirrhosis of the liver    Diabetes Brother     Other (No cancer-prostate or ) Other     Other (No urolithiasis) Other       Social History:   Social History     Socioeconomic History    Marital status:     Number of children: 2   Occupational History    Occupation: [employer]      Comment: full time   Tobacco Use    Smoking status: Former     Types: Cigarettes    Smokeless tobacco: Never    Tobacco comments:     1 unit per day, 20 years, 20 unit years, year quit 1990 pr NG   Substance and Sexual Activity    Alcohol use: Yes     Comment: socially    Drug use: No   Other Topics Concern    Caffeine Concern Yes     Comment: Coffee, soda        Immunizations:   Immunization History   Administered Date(s) Administered    Covid-19 Vaccine Pfizer 30 mcg/0.3 ml 02/23/2021, 03/18/2021, 11/09/2021       Medications (Active prior to today's visit):  Current Outpatient Medications   Medication Sig Dispense Refill    rosuvastatin 5 MG Oral Tab Take 1 tablet (5 mg total) by mouth nightly. 90 tablet 2    minoxidil 2.5 MG Oral Tab Take 1 tablet (2.5 mg total) by mouth daily. 180 tablet 0    diclofenac 1 % External Gel Apply 2 g topically 4 (four) times daily. 100 g 1    metoprolol succinate ER 50 MG Oral Tablet 24 Hr Take 1 tablet (50 mg total) by mouth daily.      omeprazole 20 MG Oral Capsule Delayed Release Take 1 capsule (20 mg total) by mouth daily.      aspirin 81 MG Oral Tab Take 1 tablet (81 mg total) by mouth daily. 30 tablet 5       Allergies:  No Known Allergies      ROS:   Review of Systems   Constitutional:  Negative for appetite change, fatigue and fever.   Respiratory:  Negative for shortness of breath.    Cardiovascular:  Positive for chest pain. Negative for palpitations.   Gastrointestinal:  Negative for abdominal pain.   Musculoskeletal:  Negative for myalgias.   Skin:  Negative for rash.   Neurological:  Negative for dizziness, weakness and headaches.       PHYSICAL EXAM:   VS: /79 (BP Location: Left arm, Patient Position: Sitting, Cuff Size: adult)   Pulse 108   Ht 5' 6\" (1.676 m)   Wt 190 lb 12.8 oz (86.5 kg)   BMI 30.80 kg/m²     Physical Exam  Vitals reviewed.   Constitutional:       Appearance: Normal appearance. He is well-developed.   HENT:      Head: Normocephalic.    Eyes:      General: No scleral icterus.     Conjunctiva/sclera: Conjunctivae normal.   Cardiovascular:      Rate and Rhythm: Normal rate and regular rhythm.      Heart sounds: Normal heart sounds. Heart sounds not distant.      Comments: + mild left chest tenderness  Pulmonary:      Effort: Pulmonary effort is normal.      Breath sounds: Normal breath sounds.   Musculoskeletal:      Cervical back: Neck supple.   Skin:     Findings: No rash.   Psychiatric:         Mood and Affect: Mood normal.         LABORATORY RESULTS:     EKG / Spirometry : -     Radiology: No results found.     ASSESSMENT/PLAN:   Assessment   Encounter Diagnosis   Name Primary?    Chest pain, unspecified type Yes       MEDICATIONS:   CPM          REFERRALS: CARD TREADMILL STRESS, ADULT (CPT=93017)  EKG 12-LEAD, STAT today        RECOMMENDATIONS given include: Patient was reassured of  his medical condition and all questions and concerns were answered. Patient was informed to please, call our office with any new or further questions or concerns that may come up in the near future. Notify Dr Espinal or the [facility] if there is a deterioration or worsening of the medical condition. Also, inform the doctor with any new symptoms or medications' side effects.  Go to the ER if chest pains get worse.     FOLLOW-UP: Schedule a follow-up visit in 2 weeks with PCP.            Orders This Visit:  No orders of the defined types were placed in this encounter.      Meds This Visit:  Requested Prescriptions      No prescriptions requested or ordered in this encounter       Imaging & Referrals:  CARD TREADMILL STRESS, ADULT (CPT=93017)  EKG 12-LEAD         ESTHELA ESPINAL MD

## 2024-04-15 ENCOUNTER — HOSPITAL ENCOUNTER (OUTPATIENT)
Dept: CV DIAGNOSTICS | Facility: HOSPITAL | Age: 59
Discharge: HOME | End: 2024-04-15
Attending: FAMILY MEDICINE
Payer: COMMERCIAL

## 2024-04-15 DIAGNOSIS — R07.9 CHEST PAIN, UNSPECIFIED TYPE: ICD-10-CM

## 2024-04-15 LAB
% OF MAX PREDICTED HR: 100 %
MAX DIASTOLIC BP: 84 MMHG
MAX HEART RATE: 169 BPM
MAX PREDICTED HEART RATE: 162 BPM
MAX SYSTOLIC BP: 182 MMHG
MAX WORK LOAD: 134

## 2024-04-15 PROCEDURE — 93016 CV STRESS TEST SUPVJ ONLY: CPT | Performed by: INTERNAL MEDICINE

## 2024-04-15 PROCEDURE — 93017 CV STRESS TEST TRACING ONLY: CPT | Performed by: FAMILY MEDICINE

## 2024-04-15 PROCEDURE — 93018 CV STRESS TEST I&R ONLY: CPT | Performed by: INTERNAL MEDICINE

## 2024-05-30 ENCOUNTER — TELEPHONE (OUTPATIENT)
Dept: FAMILY MEDICINE CLINIC | Facility: CLINIC | Age: 59
End: 2024-05-30

## 2024-05-30 NOTE — TELEPHONE ENCOUNTER
Patient contacts clinic reporting his copay for calcium scoring is $400.  Nurse advised that this screening test is not usually billed through insurance and is available for around $75 through our organization.  He will contact central scheduling.

## 2024-06-25 ENCOUNTER — HOSPITAL ENCOUNTER (OUTPATIENT)
Dept: CT IMAGING | Age: 59
Discharge: HOME | End: 2024-06-25
Attending: FAMILY MEDICINE

## 2024-06-25 DIAGNOSIS — R07.2 PRECORDIAL PAIN: ICD-10-CM

## 2024-06-25 NOTE — PROGRESS NOTES
Date of Service 6/25/2024    FRANCESCA CUMMINGS  Date of Birth 5/9/1965    Patient Age: 59 year old    PCP: No primary care provider on file.    Heart Scan Consult  Preliminary Heart Scan Score: 872    Previous Screening  Heart Scan Completed Previously: No                   Risk Factors  Personal Risk Factors  Non-alterable Risk Factors:  (Father had stent in his 70's, Brother had bypasses in his 40's.)      Body Mass Index  BMI 30    Blood Pressure  /79 on med.  (Normal =< 120/80,  Elevated = 120-129/ >80,  High Stage1 130-139/80-89 , Stage2 >140/>90)    Lipid Profile  Cholesterol: 149, done on 2/10/2024.  HDL Cholesterol: 42, done on 2/10/2024.  LDL Cholesterol: 87, done on 2/10/2024.  TriGlycerides 106, done on 2/10/2024.  He is on med.    Cholesterol Goals  Value   Total  =< 200   HDL  = > 45 Men = > 55 Women   LDL   =< 100   Triglycerides  =< 150       Glucose and Hemoglobin A1C  Lab Results   Component Value Date    GLU 95 02/10/2024    A1C 5.9 (H) 02/10/2024     (Normal Fasting Glucose < 100mg/dl )    Nurse Review  Risk factor information and results reviewed with Nurse: Yes    Recommended Follow Up:  Consult your physician regarding:: Final Heart Scan Report;Discuss potential for Incidental Finding      Recommendations for Change:  Nutrition Changes: Low Saturated Fat;Low Fat Dairy    Cholesterol Modification (goal of therapy depends upon your risk): Increase HDL (Healthy/Good) Normal >45 Men >55 Women    Exercise: Enhance Current Program    Smoking Cessation: No Change Needed    Weight Management: Decrease Current Weight    Stress Management: Adopt Stress Management Techniques    Repeat Heart Scan: 3 Years if Calcium Score is > 0.0;Discuss with your Physician              [place] Recommended Resources:  Recommended Resources: Upcoming Classes, Medical Services and Health Library [email or web address]            Ambar GARY RN        Please Contact the Nurse Heart Line with any Questions or Concerns  [phone].

## 2024-07-01 ENCOUNTER — TELEPHONE (OUTPATIENT)
Dept: INTERNAL MEDICINE CLINIC | Facility: CLINIC | Age: 59
End: 2024-07-01

## 2024-07-01 PROBLEM — R73.03 PRE-DIABETES: Status: ACTIVE | Noted: 2024-07-01

## 2024-07-01 NOTE — TELEPHONE ENCOUNTER
Called pt LMTCB,  Pt has appt today at 10:40 to \" discuss results for calcium score \"      if hes only coming for Calcium CT score (its not back) it takes up to weeks for the cardiologist portion to read the results.  if he has other issues thats fine can keep appt and will address. but if its for calcium ct scan the cardiac portion done by cardiologist is still pending (takes time) would suggest rescheduling until we get results back.    Please reach out to patient  
Detail Level: Detailed
Pt has rescheduled appt to 7/22/24 closing this te  
Add 59297 Cpt? (Important Note: In 2017 The Use Of 58801 Is Being Tracked By Cms To Determine Future Global Period Reimbursement For Global Periods): no

## 2024-07-17 NOTE — TELEPHONE ENCOUNTER
Please review. Protocol Pass    Former patient of Dr. Solano    Future Appointments   Date Time Provider Department Center   7/22/2024  4:00 PM Nate Portillo MD [facility]     To establish care        Requested Prescriptions   Pending Prescriptions Disp Refills    omeprazole 20 MG Oral Capsule Delayed Release  0     Sig: Take 1 capsule (20 mg total) by mouth daily.       Gastrointestional Medication Protocol Passed - 7/13/2024  2:28 PM        Passed - In person appointment or virtual visit in the past 12 mos or appointment in next 3 mos     Recent Outpatient Visits              3 months ago Chest pain, unspecified type    [facility] ([place], [place]), Kenny Mir MD    Office Visit    5 months ago [facility]    Office Visit    9 months ago Acne keloidalis nuchae    [facility], Shelton Connelly MD    Office Visit    10 months ago Precordial pain    [facility] [address], [place] Julia Cordon MD    Office Visit    11 months ago Well adult exam    [facility]    [facility]          Future Appointments         Provider Department Appt Notes    In 5 days Nate Portillo MD [facility] Establish care/pt of Dr. Solano discuss results. Policy informed to pt                           Future Appointments         Provider Department Appt Notes    In 5 days Nate Portillo MD [facility] Establish care/pt of Dr. Solano discuss results. Policy informed to pt          Recent Outpatient Visits              3 months ago Chest pain, unspecified type    [facility] ([place], [place]), Kenny Mir MD    Office Visit    5 months ago Lower leg edema    [facility]  [place], Amna Mccall APRN    Office Visit    9 months ago Acne keloidalis nuchae    [facility], Shelton Connelly MD    Office Visit    10 months ago Precordial pain    [facility], [address], [place] Julia Cordon MD    Office Visit    11 months ago Well adult exam    [facility], [address], Amna Mccall APRN    Office Visit

## 2024-07-22 ENCOUNTER — OFFICE VISIT (OUTPATIENT)
Dept: INTERNAL MEDICINE CLINIC | Facility: CLINIC | Age: 59
End: 2024-07-22
Payer: COMMERCIAL

## 2024-07-22 VITALS
DIASTOLIC BLOOD PRESSURE: 77 MMHG | HEART RATE: 65 BPM | SYSTOLIC BLOOD PRESSURE: 117 MMHG | HEIGHT: 66 IN | BODY MASS INDEX: 30.53 KG/M2 | WEIGHT: 190 LBS

## 2024-07-22 DIAGNOSIS — R93.1 ELEVATED CORONARY ARTERY CALCIUM SCORE: Primary | ICD-10-CM

## 2024-07-22 DIAGNOSIS — L65.9 HAIR LOSS: ICD-10-CM

## 2024-07-22 DIAGNOSIS — K21.9 GASTROESOPHAGEAL REFLUX DISEASE WITHOUT ESOPHAGITIS: ICD-10-CM

## 2024-07-22 DIAGNOSIS — I70.90 ATHEROSCLEROSIS: ICD-10-CM

## 2024-07-22 DIAGNOSIS — Z82.49 FAMILY HISTORY OF PREMATURE CAD: ICD-10-CM

## 2024-07-22 DIAGNOSIS — R07.9 CHEST PAIN, UNSPECIFIED TYPE: ICD-10-CM

## 2024-07-22 PROCEDURE — 3008F BODY MASS INDEX DOCD: CPT | Performed by: STUDENT IN AN ORGANIZED HEALTH CARE EDUCATION/TRAINING PROGRAM

## 2024-07-22 PROCEDURE — 99205 OFFICE O/P NEW HI 60 MIN: CPT | Performed by: STUDENT IN AN ORGANIZED HEALTH CARE EDUCATION/TRAINING PROGRAM

## 2024-07-22 PROCEDURE — 3074F SYST BP LT 130 MM HG: CPT | Performed by: STUDENT IN AN ORGANIZED HEALTH CARE EDUCATION/TRAINING PROGRAM

## 2024-07-22 PROCEDURE — 3078F DIAST BP <80 MM HG: CPT | Performed by: STUDENT IN AN ORGANIZED HEALTH CARE EDUCATION/TRAINING PROGRAM

## 2024-07-22 RX ORDER — MINOXIDIL 2.5 MG/1
2.5 TABLET ORAL DAILY
Qty: 180 TABLET | Refills: 0 | Status: SHIPPED | OUTPATIENT
Start: 2024-07-22 | End: 2024-07-22

## 2024-07-22 RX ORDER — OMEPRAZOLE 20 MG/1
20 CAPSULE, DELAYED RELEASE ORAL DAILY
Qty: 90 CAPSULE | Refills: 0 | Status: SHIPPED | OUTPATIENT
Start: 2024-07-22 | End: 2024-10-20

## 2024-07-22 RX ORDER — ROSUVASTATIN CALCIUM 40 MG/1
40 TABLET, COATED ORAL DAILY
COMMUNITY
Start: 2024-07-11

## 2024-07-22 RX ORDER — MINOXIDIL 2.5 MG/1
2.5 TABLET ORAL DAILY
Qty: 180 TABLET | Refills: 0 | Status: SHIPPED | OUTPATIENT
Start: 2024-07-22

## 2024-07-22 NOTE — PROGRESS NOTES
OFFICE NOTE     Patient ID: Rui Saravia is a 59 year old male.  Today's Date: 24  Chief Complaint: Test Results (Discuss ct calcium score)    Pt is a 58y/o male with Pmhx of GERD, pre-diabetes, Male pattern hair loss, HLD, HTN, newly diagnosed CAD with FMHx of early CAD whom presents to clinic to establish care with Internal medicine and discuss calcium CT scan results.         Did not exercise much since it worsened his chest pain (exertional chest pain).     April 15/2024 Normal Exercise Ecg, no arrhythmia, no chest pain, and per patient had completed exam. But unclear if false negative with chest pain on exertion for years.     EKG 2024 normal EKG,      Total Calcium score   REGION CALCIUM SCORE      LEFT MAIN: 62   LEFT ANTERIOR DESCENDIN   CIRCUMFLEX: 263   RIGHT CORONARY ARTERY:   0      TOTAL CALCIUM SCORE: 873       Vitals:    24 1554   BP: 117/77   Pulse: 65   Weight: 190 lb (86.2 kg)   Height: 5' 6\" (1.676 m)     body mass index is 30.67 kg/m².  BP Readings from Last 3 Encounters:   24 117/77   24 128/79   24 130/83     The 10-year ASCVD risk score (Merissa JUAREZ, et al., 2019) is: 6.8%    Values used to calculate the score:      Age: 59 years      Sex: Male      Is Non- : No      Diabetic: No      Tobacco smoker: No      Systolic Blood Pressure: 117 mmHg      Is BP treated: Yes      HDL Cholesterol: 42 mg/dL      Total Cholesterol: 149 mg/dL      Medications reviewed:  Current Outpatient Medications   Medication Sig Dispense Refill    rosuvastatin 40 MG Oral Tab Take 1 tablet (40 mg total) by mouth daily.      omeprazole 20 MG Oral Capsule Delayed Release Take 1 capsule (20 mg total) by mouth daily. 90 capsule 0    minoxidil 2.5 MG Oral Tab Take 1 tablet (2.5 mg total) by mouth daily. 180 tablet 0    diclofenac 1 % External Gel Apply 2 g topically 4 (four) times daily. 100 g 1    metoprolol succinate ER 50 MG Oral Tablet 24 Hr  Take 1 tablet (50 mg total) by mouth daily.      aspirin 81 MG Oral Tab Take 1 tablet (81 mg total) by mouth daily. 30 tablet 5         Assessment & Plan    1. Elevated coronary artery calcium score (Primary)  Comments:  LEFT ANTERIOR DESCENDIN   CIRCUMFLEX: 263   RIGHT CORONARY ARTERY:   0      TOTAL CALCIUM SCORE: 873  Orders:  -     Cancel: [facility] CARDIOLOGY EXTERNAL  -     US CAROTID DOPPLER BILAT - DIAG IMG (CPT=93880); Future; Expected date: 2024  -     [facility] CARDIOLOGY EXTERNAL  -     Cancel: CARD ECHO 2D DOPPLER (CPT=93306); Future; Expected date: 2024  -     CARD ECHO 2D DOPPLER (CPT=93306); Future; Expected date: 2024  Pt with Chest pain and dyspnea on exertion, with recent suspected false negative exercise EKG, with recent elevated calcium ct scan score of 873 (was seen by [facility] referred by last PCP) and increased crestor to 40mg and aspirin 81mg whom presented to establish with new PCP/internist.   Plan  -Continue high intensity statin,    -order carotid ultrasound and TTE for cardiac evaluation  -follow up with Dr. Iain callahan for further workup might need nuclear perfusion scan. +/PCI ( I already messaged him and will follow up with pt    2. Chest pain, unspecified type  -     US CAROTID DOPPLER BILAT - DIAG IMG (CPT=93880); Future; Expected date: 2024  -     Cancel: CARD ECHO 2D DOPPLER (CPT=93306); Future; Expected date: 2024  -     CARD ECHO 2D DOPPLER (CPT=93306); Future; Expected date: 2024  Pt with Chest pain and dyspnea on exertion, with recent suspected false negative exercise EKG, with recent elevated calcium ct scan score of 873 (was seen by [facility] referred by last PCP) and increased crestor to 40mg and aspirin 81mg whom presented to establish with new PCP/internist.   Plan  -Continue high intensity statin,    -order carotid ultrasound and TTE for cardiac evaluation  -follow up with Dr. Timmons  cardiology for further workup might need nuclear perfusion scan. +/PCI ( I already messaged him and will follow up with pt    3. Family history of premature CAD  -     US CAROTID DOPPLER BILAT - DIAG IMG (CPT=93880); Future; Expected date: 07/22/2024  -     Cancel: CARD ECHO 2D DOPPLER (CPT=93306); Future; Expected date: 07/22/2024  -     CARD ECHO 2D DOPPLER (CPT=93306); Future; Expected date: 07/22/2024  Pt with Chest pain and dyspnea on exertion, with recent suspected false negative exercise EKG, with recent elevated calcium ct scan score of 873 (was seen by [facility] referred by last PCP) and increased crestor to 40mg and aspirin 81mg whom presented to establish with new PCP/internist.   Plan  -Continue high intensity statin,    -order carotid ultrasound and TTE for cardiac evaluation  -follow up with Dr. Timmons cardiology for further workup might need nuclear perfusion scan. +/PCI ( I already messaged him and will follow up with pt    4. Atherosclerosis  -     US CAROTID DOPPLER BILAT - DIAG IMG (CPT=93880); Future; Expected date: 07/22/2024  -     Cancel: CARD ECHO 2D DOPPLER (CPT=93306); Future; Expected date: 07/22/2024  -     CARD ECHO 2D DOPPLER (CPT=93306); Future; Expected date: 07/22/2024  Pt with Chest pain and dyspnea on exertion, with recent suspected false negative exercise EKG, with recent elevated calcium ct scan score of 873 (was seen by [facility] referred by last PCP) and increased crestor to 40mg and aspirin 81mg whom presented to establish with new PCP/internist.   Plan  -Continue high intensity statin,    -order carotid ultrasound and TTE for cardiac evaluation  -follow up with Dr. Timmons cardiology for further workup might need nuclear perfusion scan. +/PCI ( I already messaged him and will follow up with pt    5. Hair loss  -     Discontinue: Minoxidil; Take 1 tablet (2.5 mg total) by mouth daily.  Dispense: 180 tablet; Refill: 0  -     Minoxidil; Take 1 tablet (2.5 mg total) by  mouth daily.  Dispense: 180 tablet; Refill: 0  Pt with hair loss  Plan;  -continue minoxidil daily for now    6. Gastroesophageal reflux disease without esophagitis  -     Omeprazole; Take 1 capsule (20 mg total) by mouth daily.  Dispense: 90 capsule; Refill: 0  Well controlled continue omeprazole 20mg po daily        Follow Up: As needed/if symptoms worsen or No follow-ups on file..     I spent 63 minutes obtaining pertitent medical history, reviewing pertinent imaging/labs and specialists notes, evaluating patient, discussing differential diagnosis' and various treatment options, reinforcing importance of compliance with treatment plan, and completing documentation.     Encounter Times  PreCharting:   minutes    Reviewing/Obtaining:   minutes      Medical Exam:   minutes    Plan:   minutes      Notes:   minutes    Counseling/Education:   minutes      Referring/Communicating:   minutes    Ind Interpretation:   minutes      Care Coordination:   minutes       My total time spent caring for the patient on the day of the encounter:   minutes.          Objective/ Results:   Physical Exam  Constitutional:       Appearance: He is well-developed. He is obese.   Cardiovascular:      Rate and Rhythm: Normal rate and regular rhythm.      Heart sounds: Normal heart sounds.   Pulmonary:      Effort: Pulmonary effort is normal.      Breath sounds: Normal breath sounds.   Abdominal:      General: Bowel sounds are normal.      Palpations: Abdomen is soft.   Skin:     General: Skin is warm and dry.   Neurological:      Mental Status: He is alert and oriented to person, place, and time.      Deep Tendon Reflexes: Reflexes are normal and symmetric.        Reviewed:    Patient Active Problem List    Diagnosis    Family history of premature CAD    Pre-diabetes    Chest pain of uncertain etiology    Hypokalemia    Hyperglycemia    Chest pain    Microhematuria    Acute chest pain    Dyspnea on exertion    Bladder neck obstruction      No  Known Allergies     Social History     Socioeconomic History    Marital status:     Number of children: 2   Occupational History    Occupation: [employer]     Comment: full time   Tobacco Use    Smoking status: Former     Current packs/day: 0.00     Average packs/day: 0.3 packs/day for 10.0 years (2.5 ttl pk-yrs)     Types: Cigarettes     Start date:      Quit date:      Years since quittin.5     Passive exposure: Past    Smokeless tobacco: Never    Tobacco comments:     1 unit per day, 20 years, 20 unit years, year quit  pr NG   Vaping Use    Vaping status: Never Used   Substance and Sexual Activity    Alcohol use: Yes     Comment: socially    Drug use: No   Other Topics Concern    Caffeine Concern Yes     Comment: Coffee, soda      Review of Systems   Constitutional: Negative.    Respiratory:  Positive for shortness of breath (on exertion).    Cardiovascular:  Positive for chest pain (on exertion).   Gastrointestinal: Negative.    Skin: Negative.    Neurological: Negative.      All other systems negative unless otherwise stated in ROS or HPI above.       Nate Portillo MD  Internal Medicine       Call office with any questions or seek emergency care if necessary.   Patient understands and agrees to follow directions and advice.      ----------------------------------------- PATIENT INSTRUCTIONS-----------------------------------------     There are no Patient Instructions on file for this visit.      The  [employer] Cures Act makes medical notes available to patients in the interest of transparency.  However, please be advised that this is a medical document.  It is intended as a peer to peer communication.  It is written in medical language and may contain abbreviations or verbiage that are technical and unfamiliar.  It may appear blunt or direct.  Medical documents are intended to carry relevant information, facts as evident, and the clinical opinion of the practitioner.

## 2024-07-24 ENCOUNTER — HOSPITAL ENCOUNTER (OUTPATIENT)
Dept: CV DIAGNOSTICS | Facility: HOSPITAL | Age: 59
Discharge: HOME | End: 2024-07-24
Attending: STUDENT IN AN ORGANIZED HEALTH CARE EDUCATION/TRAINING PROGRAM
Payer: COMMERCIAL

## 2024-07-24 ENCOUNTER — HOSPITAL ENCOUNTER (OUTPATIENT)
Dept: ULTRASOUND IMAGING | Facility: HOSPITAL | Age: 59
Discharge: HOME | End: 2024-07-24
Attending: STUDENT IN AN ORGANIZED HEALTH CARE EDUCATION/TRAINING PROGRAM
Payer: COMMERCIAL

## 2024-07-24 DIAGNOSIS — R07.9 CHEST PAIN, UNSPECIFIED TYPE: ICD-10-CM

## 2024-07-24 DIAGNOSIS — Z82.49 FAMILY HISTORY OF PREMATURE CAD: ICD-10-CM

## 2024-07-24 DIAGNOSIS — I70.90 ATHEROSCLEROSIS: ICD-10-CM

## 2024-07-24 DIAGNOSIS — R93.1 ELEVATED CORONARY ARTERY CALCIUM SCORE: ICD-10-CM

## 2024-07-24 PROCEDURE — 93306 TTE W/DOPPLER COMPLETE: CPT | Performed by: STUDENT IN AN ORGANIZED HEALTH CARE EDUCATION/TRAINING PROGRAM

## 2024-07-24 PROCEDURE — 93880 EXTRACRANIAL BILAT STUDY: CPT | Performed by: STUDENT IN AN ORGANIZED HEALTH CARE EDUCATION/TRAINING PROGRAM

## 2024-07-25 NOTE — PROGRESS NOTES
Please relay to pt:    Shaniqua Mr. Saravia,  Your ultrasound of the neck (carotid) arteries show 0-49% stenosis (very mild) fortunately no surgery needed, just lifestyle changes and continue with cholesterol medication and aspirin.please continue follow up with Dr. Timmons.   -Dr. Portillo

## 2024-07-25 NOTE — PROGRESS NOTES
Please relay to pt (see also carotid results);    Shaniqua Mr. Saravia,    Your Ultrasound of the heart shows overall normal heart pump function and valves, no signs prior heart attacks or defects. Please make sure to follow up with Cardiology Dr. Timmons for coronary arteries evaluation.   -

## 2024-08-09 ENCOUNTER — LAB ENCOUNTER (OUTPATIENT)
Dept: LAB | Age: 59
End: 2024-08-09
Attending: INTERNAL MEDICINE
Payer: COMMERCIAL

## 2024-08-09 DIAGNOSIS — R73.9 HYPERGLYCEMIA: ICD-10-CM

## 2024-08-09 DIAGNOSIS — R07.9 ACUTE CHEST PAIN: Primary | ICD-10-CM

## 2024-08-09 DIAGNOSIS — R06.09 DYSPNEA ON EXERTION: ICD-10-CM

## 2024-08-09 LAB
ALT SERPL-CCNC: 43 U/L
AST SERPL-CCNC: 31 U/L (ref ?–34)
CHOLEST SERPL-MCNC: 94 MG/DL (ref ?–200)
FASTING PATIENT LIPID ANSWER: YES
HDLC SERPL-MCNC: 41 MG/DL (ref 40–59)
LDLC SERPL CALC-MCNC: 34 MG/DL (ref ?–100)
NONHDLC SERPL-MCNC: 53 MG/DL (ref ?–130)
TRIGL SERPL-MCNC: 98 MG/DL (ref 30–149)
VLDLC SERPL CALC-MCNC: 13 MG/DL (ref 0–30)

## 2024-08-09 PROCEDURE — 36415 COLL VENOUS BLD VENIPUNCTURE: CPT

## 2024-08-09 PROCEDURE — 80061 LIPID PANEL: CPT

## 2024-08-09 PROCEDURE — 84450 TRANSFERASE (AST) (SGOT): CPT

## 2024-08-09 PROCEDURE — 84460 ALANINE AMINO (ALT) (SGPT): CPT

## 2024-08-21 ENCOUNTER — OFFICE VISIT (OUTPATIENT)
Dept: INTERNAL MEDICINE CLINIC | Facility: CLINIC | Age: 59
End: 2024-08-21
Payer: COMMERCIAL

## 2024-08-21 VITALS
SYSTOLIC BLOOD PRESSURE: 115 MMHG | HEIGHT: 66 IN | BODY MASS INDEX: 30.53 KG/M2 | DIASTOLIC BLOOD PRESSURE: 71 MMHG | WEIGHT: 190 LBS | HEART RATE: 71 BPM | OXYGEN SATURATION: 99 %

## 2024-08-21 DIAGNOSIS — E55.9 VITAMIN D DEFICIENCY: ICD-10-CM

## 2024-08-21 DIAGNOSIS — R73.03 PRE-DIABETES: ICD-10-CM

## 2024-08-21 DIAGNOSIS — Z82.49 FAMILY HISTORY OF PREMATURE CAD: ICD-10-CM

## 2024-08-21 DIAGNOSIS — Z12.11 ENCOUNTER FOR SCREENING COLONOSCOPY: ICD-10-CM

## 2024-08-21 DIAGNOSIS — R31.29 MICROHEMATURIA: ICD-10-CM

## 2024-08-21 DIAGNOSIS — Z00.00 ANNUAL PHYSICAL EXAM: Primary | ICD-10-CM

## 2024-08-21 DIAGNOSIS — R07.9 CHEST PAIN, UNSPECIFIED TYPE: ICD-10-CM

## 2024-08-21 PROBLEM — E87.6 HYPOKALEMIA: Status: RESOLVED | Noted: 2017-12-21 | Resolved: 2024-08-21

## 2024-08-21 PROCEDURE — 3074F SYST BP LT 130 MM HG: CPT | Performed by: STUDENT IN AN ORGANIZED HEALTH CARE EDUCATION/TRAINING PROGRAM

## 2024-08-21 PROCEDURE — 3078F DIAST BP <80 MM HG: CPT | Performed by: STUDENT IN AN ORGANIZED HEALTH CARE EDUCATION/TRAINING PROGRAM

## 2024-08-21 PROCEDURE — 99396 PREV VISIT EST AGE 40-64: CPT | Performed by: STUDENT IN AN ORGANIZED HEALTH CARE EDUCATION/TRAINING PROGRAM

## 2024-08-21 PROCEDURE — 3008F BODY MASS INDEX DOCD: CPT | Performed by: STUDENT IN AN ORGANIZED HEALTH CARE EDUCATION/TRAINING PROGRAM

## 2024-08-21 RX ORDER — CLOBETASOL PROPIONATE 0.5 MG/ML
SOLUTION TOPICAL
COMMUNITY
Start: 2024-06-07

## 2024-08-21 RX ORDER — KETOCONAZOLE 20 MG/ML
SHAMPOO TOPICAL
COMMUNITY
Start: 2024-06-09

## 2024-08-21 NOTE — PATIENT INSTRUCTIONS
Nutritional Goals Reviewed and Discussed:   Limit Carbohydrates to 100gm per day, Eat 100-200 calories within 1 hour of awkaing up, Eat 3-4 cups of fresh fruits or vegetables daily    Behavior Modification Reviewed and Discussed:  Eat breakfast, Eat 3 meals per day, Plan meals in advance (if unable to cook, meal prep service such as [id number]) High protein, Low simple carbs. Read nutrition labels track calories and Macros with Queralt or [employer] rose (thus daily food journal), No drinking 30mintutes before or after meals, utilize portion control strategies to reduce caloric intake, Identify triggers for eating and manage cues, and Eat Slowly and take 20-30 minutes to complete each meal.    Goal for Next Month:  Keep Food log: At first track current daily caloric intake and limit current caloric consumption by 500 to 1,000 calories daily OR start with caloric restriction of less than 1,800 calories daily.   Increase Cardiac/cardio exercise at least 180 minutes a week (at least 3 times a week) Goal is 1hr a day (5 days a week) would prefer if enrolls in fitness classes or  at least 1x a week. (If possible to work out in the morning is proven to increase natural Dopamine, Serotonin, Endorphin, levels (Feel good and energy hormones) and reduce cortisol  (stress hormone levels).   Drink 48-64 ounces of non-caloric beverages per day. No fruit juices or regular soda.  Increase activity- upper body exercises in addition to cardio and, aim to walk 10minutes per day after dinner  Increase fruit and vegetable servings to 5-6 per day.   6. Food recommendation for Breakfast: Steel cut oatmeal:  1cup Steel cut oatmeal and 2cups unsweetened almond milk. And cinnamon (let it ,overnight in a bowl), and portion out 4 servings (separate containers/jars), then daily add one triple zero okios greek yogurt, 1 medium banana and however many berries your heart desires  All berries are good, (blueberry, raspberry,  strawberries, blackberries).   -avoid high sugar fruits (pineapple, mangos, melons, banana)- high  7. Plant based protein: Ascent Plant Based Protein Powder - Non Dairy Vegan Protein, Zero Artificial Ingredients, Soy & Gluten Free, No Added Sugar, 4g BCAA, 2g Leucine - Chocolate, 18 Servings

## 2024-08-21 NOTE — PROGRESS NOTES
History of Present Illness   Patient ID: Rui Saravia is a 59 year old male.  Chief Complaint: Follow - Up      Rui Saravia is a pleasant 59 year old with with PMHx GERD, pre-diabetes, Male pattern hair loss, HLD, HTN, newly diagnosed CAD with FMHx of early CAD whom presents to clinic for annual physical     Stress test next week:    male who presents for annual physical exam. Rui Saravia is doing well today.    Saw Cardiologist Dr. 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   Health Maintenance Due   Topic Date Due    Colorectal Cancer Screening  Never done    PSA  07/17/2019    COVID-19 Vaccine (4 - 2023-24 season) 09/01/2023    Annual Physical  07/28/2024       Colonoscopy (45+ or Screening of first-degree relatives of CRC patients is recommended to begin at age 40 or 10 years before the age at diagnosis of the youngest relative diagnosed with CRC): Due    Review of Systems  Review of Systems   Constitutional: Negative.    Respiratory: Negative.     Cardiovascular:  Positive for chest pain.   Gastrointestinal: Negative.    Skin: Negative.    Neurological: Negative.        Physical Exam  Vitals:    08/21/24 1614   BP: 115/71   Pulse: 71   SpO2: 99%   Weight: 190 lb (86.2 kg)   Height: 5' 6\" (1.676 m)     Body mass index is 30.67 kg/m².  BP Readings from Last 3 Encounters:   08/21/24 115/71   07/22/24 117/77   04/08/24 128/79     Physical Exam  Constitutional:       Appearance: He is well-developed.   Cardiovascular:      Rate and Rhythm: Normal rate and regular rhythm.      Heart sounds: Normal heart sounds.   Pulmonary:      Effort: Pulmonary effort is normal.      Breath sounds: Normal breath sounds.   Abdominal:      General: Bowel sounds are normal.      Palpations: Abdomen is soft.   Skin:     General: Skin is warm and dry.   Neurological:      Mental Status: He is alert and oriented to person, place,  and time.      Deep Tendon Reflexes: Reflexes are normal and symmetric.           Labs & Imaging  Pertinent labs and imaging reviewed.   Lab Results   Component Value Date    GLU 95 02/10/2024    BUN 9 02/10/2024    BUNCREA 11.5 02/10/2024    CREATSERUM 0.78 02/10/2024    ANIONGAP 7 02/10/2024    GFRNAA >60 12/22/2017    GFRAA >60 12/22/2017    CA 9.1 02/10/2024    OSMOCALC 286 02/10/2024    ALKPHO 91 02/10/2024    AST 31 08/09/2024    ALT 43 08/09/2024    BILT 0.8 02/10/2024    TP 7.3 02/10/2024    ALB 4.3 02/10/2024    GLOBULIN 3.0 02/10/2024    AGRATIO 1.9 11/30/2012     02/10/2024    K 4.3 02/10/2024     02/10/2024    CO2 27.0 02/10/2024     Lab Results   Component Value Date     02/10/2024    A1C 5.9 (H) 02/10/2024     Lab Results   Component Value Date    WBC 6.4 08/08/2023    RBC 5.31 08/08/2023    HGB 16.0 08/08/2023    HCT 48.0 08/08/2023    MCV 90.4 08/08/2023    MCH 30.1 08/08/2023    MCHC 33.3 08/08/2023    RDW 12.4 08/08/2023    .0 08/08/2023    MPV 8.1 12/22/2017     Lab Results   Component Value Date    CHOLEST 94 08/09/2024    TRIG 98 08/09/2024    HDL 41 08/09/2024    LDL 34 08/09/2024    VLDL 13 08/09/2024    NONHDLC 53 08/09/2024     The ASCVD Risk score (Merissa JUAREZ, et al., 2019) failed to calculate for the following reasons:    The valid total cholesterol range is 130 to 320 mg/dL    Medical History    Reviewed allergies:  No Known Allergies     Reviewed:  Patient Active Problem List    Diagnosis    Family history of premature CAD    Pre-diabetes    Hyperglycemia    Chest pain    Microhematuria    Bladder neck obstruction      Reviewed:  Past Medical History:    BPH (benign prostatic hyperplasia)    Coronary atherosclerosis    Disorder of liver    fatty liver    Esophageal reflux    H/O right inguinal hernia repair    High cholesterol    Hyperlipidemia    PONV (postoperative nausea and vomiting)    Visual impairment      Reviewed:  Family History   Problem Relation Age  of Onset    Other (cirrhosis) Mother         cirrhosis of the liver    Diabetes Father     Heart Disease Father     Diabetes Sister     Diabetes Brother     Heart Disease Brother     Other (CABG) Brother 42    Diabetes Brother     Diabetes Paternal Grandmother     Diabetes Paternal Grandfather     Other (No cancer-prostate or ) Other     Other (No urolithiasis) Other        Reviewed:  Past Surgical History:   Procedure Laterality Date    Cholecystectomy      Laparoscopic     Hernia surgery Right     inguinal hernia    Vasectomy      Vasectomy        Reviewed:  Social History     Socioeconomic History    Marital status:     Number of children: 2   Occupational History    Occupation:      Comment: full time   Tobacco Use    Smoking status: Former     Current packs/day: 0.00     Average packs/day: 0.3 packs/day for 10.0 years (2.5 ttl pk-yrs)     Types: Cigarettes     Start date:      Quit date:      Years since quittin.6     Passive exposure: Past    Smokeless tobacco: Never    Tobacco comments:     1 unit per day, 20 years, 20 unit years, year quit  pr NG   Vaping Use    Vaping status: Never Used   Substance and Sexual Activity    Alcohol use: Yes     Comment: socially    Drug use: No   Other Topics Concern    Caffeine Concern Yes     Comment: Coffee, soda      Reviewed:  Current Outpatient Medications   Medication Sig Dispense Refill    ketoconazole 2 % External Shampoo USE 2 -3 TIMES WEEKLY- LATHER INTO SCALP AND LEAVE ON FOR 5 MINUTES BEFORE WASHING OFF      clobetasol 0.05 % External Solution USE TWICE DAILY MONDAY-FRIDAY WITH FLARES - DO NOT USE ON FACE      rosuvastatin 40 MG Oral Tab Take 1 tablet (40 mg total) by mouth daily.      omeprazole 20 MG Oral Capsule Delayed Release Take 1 capsule (20 mg total) by mouth daily. 90 capsule 0    minoxidil 2.5 MG Oral Tab Take 1 tablet (2.5 mg total) by mouth daily. 180 tablet 0    diclofenac 1 % External Gel Apply 2 g topically 4 (four)  times daily. 100 g 1    metoprolol succinate ER 50 MG Oral Tablet 24 Hr Take 1 tablet (50 mg total) by mouth daily.      aspirin 81 MG Oral Tab Take 1 tablet (81 mg total) by mouth daily. 30 tablet 5          Assessment & Plan    1. Annual physical exam  - [facility] - INTERNAL  - PSA, Total W Reflex To Free; Future  - Vitamin D; Future  - TSH W Reflex To Free T4; Future  - Lipid Panel; Future  - CBC With Differential With Platelet; Future  - Hemoglobin A1C; Future  - Comp Metabolic Panel (14); Future  Patient here for physical exam and due for following.  Plan:  -order the following Labs: CBC, CMP, Lipid Panel, A1C, TSH, Vitamin D,  PSA in males.   -Order screening colonoscopy  -upcoming stress test, followed by Cardiology Dr. Timmons  -given information on Mediterranean diet.          2. Chest pain, unspecified type  -upcoming stress test, followed by Cardiology Dr. Timmons  -given information on Mediterranean diet.   -continue high dose statin    3. Family history of premature CAD  -upcoming stress test, followed by Cardiology Dr. Timmons  -given information on Mediterranean diet.     4. Pre-diabetes  - Microalb/Creat Ratio, Random Urine; Future  Check A1C and microalbumin    5. Microhematuria  - Urinalysis with Culture Reflex; Future  - Microalb/Creat Ratio, Random Urine; Future  Had microhematuria years ago  Plan  -repeat UA and microalbumin    6. Encounter for screening colonoscopy  - [facility] - INTERNAL  -Order screening colonoscopy    7. Vitamin D deficiency  - Vitamin D; Future  Pt with Vitamin D def due for screening:  Plan;  -Check vitamin D level, depending on level will give guidance on what dose to recommend per guidelines.          Follow Up:   Return in about 3 months (around 11/21/2024) for Follow up chronic disease.      Nate Portillo MD  Internal Medicine          Patient asked to sign release of information for outside records if not already requested, make future office/imaging appointments at the   prior to leaving, and to sign up for [facility] if not already active.  Preventive measures and further education discussed with patient as per after visit summary. Potential medication side effects discussed. All questions answered to best of ability.   Call office with any questions. Seek emergency care if necessary.   Patient understands and agrees to follow directions and advice.      ----------------------------------------- PATIENT INSTRUCTIONS-----------------------------------------     Patient Instructions   Nutritional Goals Reviewed and Discussed:   Limit Carbohydrates to 100gm per day, Eat 100-200 calories within 1 hour of awkaing up, Eat 3-4 cups of fresh fruits or vegetables daily    Behavior Modification Reviewed and Discussed:  Eat breakfast, Eat 3 meals per day, Plan meals in advance (if unable to cook, meal prep service such as [employer]) High protein, Low simple carbs. Read nutrition labels track calories and Macros with [employer]Pal or [employer]LeapIt rose (thus daily food journal), No drinking 30mintutes before or after meals, utilize portion control strategies to reduce caloric intake, Identify triggers for eating and manage cues, and Eat Slowly and take 20-30 minutes to complete each meal.    Goal for Next Month:  Keep Food log: At first track current daily caloric intake and limit current caloric consumption by 500 to 1,000 calories daily OR start with caloric restriction of less than 1,800 calories daily.   Increase Cardiac/cardio exercise at least 180 minutes a week (at least 3 times a week) Goal is 1hr a day (5 days a week) would prefer if enrolls in fitness classes or  at least 1x a week. (If possible to work out in the morning is proven to increase natural Dopamine, Serotonin, Endorphin, levels (Feel good and energy hormones) and reduce cortisol  (stress hormone levels).   Drink 48-64 ounces of non-caloric beverages per day. No fruit juices or regular soda.  Increase activity- upper  body exercises in addition to cardio and, aim to walk 10minutes per day after dinner  Increase fruit and vegetable servings to 5-6 per day.   6. Food recommendation for Breakfast: Steel cut oatmeal:  1cup Steel cut oatmeal and 2cups unsweetened almond milk. And cinnamon (let it ,overnight in a bowl), and portion out 4 servings (separate containers/jars), then daily add one triple zero okios greek yogurt, 1 medium banana and however many berries your heart desires  All berries are good, (blueberry, raspberry, strawberries, blackberries).   -avoid high sugar fruits (pineapple, mangos, melons, banana)- high  7. Plant based protein: Ascent Plant Based Protein Powder - Non Dairy Vegan Protein, Zero Artificial Ingredients, Soy & Gluten Free, No Added Sugar, 4g BCAA, 2g Leucine - Chocolate, 18 Servings          The 21st Century Cures Act makes medical notes available to patients in the interest of transparency.  However, please be advised that this is a medical document.  It is intended as a peer to peer communication.  It is written in medical language and may contain abbreviations or verbiage that are technical and unfamiliar.  It may appear blunt or direct.  Medical documents are intended to carry relevant information, facts as evident, and the clinical opinion of the practitioner.

## 2024-08-29 ENCOUNTER — NURSE ONLY (OUTPATIENT)
Facility: CLINIC | Age: 59
End: 2024-08-29

## 2024-08-29 DIAGNOSIS — Z12.11 SCREENING FOR COLON CANCER: Primary | ICD-10-CM

## 2024-08-29 NOTE — PROGRESS NOTES
GI Staff:  TCS Colon Screening Orders    Please schedule: Colonoscopy 23991 with MAC OR IV (if appropriate)    Please send split dose Golytely bowel prep     Diagnosis: Colon Screening Z12.11   Medication adjustments: Multivitamin 7 days prior to procedure  Day before procedure, hold: None  Day of procedure, hold: None    >>>Please inform patient if new medications are started after scheduling procedure they need to call clinic to notify us.

## 2024-08-29 NOTE — PROGRESS NOTES
Called patient for scheduled TCS/positive FIT result.   Medications, pharmacy, and allergies verified.   Please advise on colonoscopy and bowel prep orders.     Age 45-74 y/o (Y/N): Yes  › GI MD preference: None  › Insurance:  BCBS I\HP HMO  › Last PCP Visit: 8/21/2024  PCP within [facility]:  › Last CBC drawn: 8/8/2023  › Date of positive FIT test:N/A  › H/W/BMI: 5'6/ 190.0 lbs/30.68    Special comments/notes:  Patient stated he had Colonoscopy procedure but is unsure how long ago it was. No Colonoscopy report on file.  Telephone Colon Screening Questionnaire Yes No   Are you currently experiencing any new/abnormal GI symptoms? [] [x]   If yes, explain:     Rectal bleeding? [] [x]   Black stool? [] [x]   Dysphagia or food \"feeling stuck\" when eating? [] [x]   Intractable vomiting? [] [x]   Unexplained weight loss? [] [x]   First colonoscopy? [] [x]   Family history of colon cancer? [] [x]   Any issues with anesthesia? [] [x]   If yes, explain:      Stroke, heart attack or stent placement in the last 12 months:  [] [x]   History of  respiratory issues/oxygen/WALLACE/COPD: [] [x]   If yes, CPAP/BiPAP:     History of devices (pacemaker/defibrillator) [] [x]   History of cardiac/CVA/(MI/stroke): [] [x]     Medications Yes  No   Anticoagulants (except Aspirin):  [] [x]   Diabetic Meds  PO: Hold day before and day of procedure  Insulin:  [] [x]   Weight loss meds (phentermine/vyvanse/saxsenda/etc): [] [x]   Iron/herbal/multivitamin supplement:Multi [x] []   Usage of marijuana, CBD &/or vape products: [] [x]

## 2024-08-29 NOTE — PROGRESS NOTES
Scheduled for:  Colonoscopy 95481  Provider Name:    Date:  12/9/2024  Location:  [facility]  Sedation:  MAC  Time:  12:30 pm, (pt is aware that [facility] will call the day before to confirm arrival time)  Prep: Golytely    Meds/Allergies Reconciled?: Physician reviewed   Diagnosis with codes: Screening for Colon Cancer Z12.11   Was patient informed to call insurance with codes (Y/N):  Yes   Referral sent?: Referral was sent at the time of electronic surgical scheduling.   EM or [facility] notified?: Electronic case request was sent to [facility] via [employer].    Medication Orders:Patient is aware to NOT take iron pills, herbal meds and diet supplements for 7 days before exam. Also to NOT take any form of alcohol, recreational drugs and any forms of ED meds 24-72 hours before exam.    Misc Orders:  N/A     Further instructions given by staff: I discussed the prep instructions with the patient which he verbally understood and is aware that I will send prep instructions today via [employer].

## 2024-09-04 ENCOUNTER — LAB ENCOUNTER (OUTPATIENT)
Dept: LAB | Facility: HOSPITAL | Age: 59
End: 2024-09-04
Attending: INTERNAL MEDICINE
Payer: COMMERCIAL

## 2024-09-04 ENCOUNTER — HOSPITAL ENCOUNTER (OUTPATIENT)
Dept: GENERAL RADIOLOGY | Facility: HOSPITAL | Age: 59
Discharge: HOME | End: 2024-09-04
Attending: INTERNAL MEDICINE
Payer: COMMERCIAL

## 2024-09-04 DIAGNOSIS — Z01.818 PREOP EXAMINATION: Primary | ICD-10-CM

## 2024-09-04 DIAGNOSIS — R73.03 PRE-DIABETES: ICD-10-CM

## 2024-09-04 DIAGNOSIS — R31.29 MICROHEMATURIA: ICD-10-CM

## 2024-09-04 DIAGNOSIS — Z00.00 ANNUAL PHYSICAL EXAM: ICD-10-CM

## 2024-09-04 DIAGNOSIS — Z01.818 PREOP EXAMINATION: ICD-10-CM

## 2024-09-04 DIAGNOSIS — E55.9 VITAMIN D DEFICIENCY: ICD-10-CM

## 2024-09-04 LAB
ALBUMIN SERPL-MCNC: 4.5 G/DL (ref 3.2–4.8)
ALBUMIN/GLOB SERPL: 1.7 {RATIO} (ref 1–2)
ALP LIVER SERPL-CCNC: 91 U/L
ALT SERPL-CCNC: 26 U/L
ANION GAP SERPL CALC-SCNC: 5 MMOL/L (ref 0–18)
AST SERPL-CCNC: 30 U/L (ref ?–34)
BASOPHILS # BLD AUTO: 0.02 X10(3) UL (ref 0–0.2)
BASOPHILS NFR BLD AUTO: 0.3 %
BILIRUB SERPL-MCNC: 0.5 MG/DL (ref 0.3–1.2)
BILIRUB UR QL: NEGATIVE
BUN BLD-MCNC: 11 MG/DL (ref 9–23)
BUN/CREAT SERPL: 15.5 (ref 10–20)
CALCIUM BLD-MCNC: 9.4 MG/DL (ref 8.7–10.4)
CHLORIDE SERPL-SCNC: 108 MMOL/L (ref 98–112)
CLARITY UR: CLEAR
CO2 SERPL-SCNC: 27 MMOL/L (ref 21–32)
COLOR UR: YELLOW
CREAT BLD-MCNC: 0.71 MG/DL
CREAT UR-SCNC: 167.6 MG/DL
DEPRECATED RDW RBC AUTO: 37.6 FL (ref 35.1–46.3)
EGFRCR SERPLBLD CKD-EPI 2021: 106 ML/MIN/1.73M2 (ref 60–?)
EOSINOPHIL # BLD AUTO: 0.16 X10(3) UL (ref 0–0.7)
EOSINOPHIL NFR BLD AUTO: 2 %
ERYTHROCYTE [DISTWIDTH] IN BLOOD BY AUTOMATED COUNT: 11.8 % (ref 11–15)
EST. AVERAGE GLUCOSE BLD GHB EST-MCNC: 120 MG/DL (ref 68–126)
FASTING STATUS PATIENT QL REPORTED: NO
GLOBULIN PLAS-MCNC: 2.7 G/DL (ref 2–3.5)
GLUCOSE BLD-MCNC: 91 MG/DL (ref 70–99)
GLUCOSE UR-MCNC: NORMAL MG/DL
HBA1C MFR BLD: 5.8 % (ref ?–5.7)
HCT VFR BLD AUTO: 42.2 %
HGB BLD-MCNC: 14.9 G/DL
HGB UR QL STRIP.AUTO: NEGATIVE
IMM GRANULOCYTES # BLD AUTO: 0.01 X10(3) UL (ref 0–1)
IMM GRANULOCYTES NFR BLD: 0.1 %
KETONES UR-MCNC: NEGATIVE MG/DL
LEUKOCYTE ESTERASE UR QL STRIP.AUTO: NEGATIVE
LYMPHOCYTES # BLD AUTO: 2.82 X10(3) UL (ref 1–4)
LYMPHOCYTES NFR BLD AUTO: 35.6 %
MCH RBC QN AUTO: 30.9 PG (ref 26–34)
MCHC RBC AUTO-ENTMCNC: 35.3 G/DL (ref 31–37)
MCV RBC AUTO: 87.6 FL
MICROALBUMIN UR-MCNC: 0.4 MG/DL
MICROALBUMIN/CREAT 24H UR-RTO: 2.4 UG/MG (ref ?–30)
MONOCYTES # BLD AUTO: 0.55 X10(3) UL (ref 0.1–1)
MONOCYTES NFR BLD AUTO: 6.9 %
NEUTROPHILS # BLD AUTO: 4.36 X10 (3) UL (ref 1.5–7.7)
NEUTROPHILS # BLD AUTO: 4.36 X10(3) UL (ref 1.5–7.7)
NEUTROPHILS NFR BLD AUTO: 55.1 %
NITRITE UR QL STRIP.AUTO: NEGATIVE
OSMOLALITY SERPL CALC.SUM OF ELEC: 289 MOSM/KG (ref 275–295)
PH UR: 6.5 [PH] (ref 5–8)
PLATELET # BLD AUTO: 250 10(3)UL (ref 150–450)
POTASSIUM SERPL-SCNC: 4.1 MMOL/L (ref 3.5–5.1)
PROT SERPL-MCNC: 7.2 G/DL (ref 5.7–8.2)
RBC # BLD AUTO: 4.82 X10(6)UL
SODIUM SERPL-SCNC: 140 MMOL/L (ref 136–145)
SP GR UR STRIP: 1.03 (ref 1–1.03)
TSI SER-ACNC: 3.09 MIU/ML (ref 0.55–4.78)
UROBILINOGEN UR STRIP-ACNC: NORMAL
VIT D+METAB SERPL-MCNC: 22.5 NG/ML (ref 30–100)
WBC # BLD AUTO: 7.9 X10(3) UL (ref 4–11)

## 2024-09-04 PROCEDURE — 84154 ASSAY OF PSA FREE: CPT

## 2024-09-04 PROCEDURE — 84153 ASSAY OF PSA TOTAL: CPT

## 2024-09-04 PROCEDURE — 82306 VITAMIN D 25 HYDROXY: CPT

## 2024-09-04 PROCEDURE — 85025 COMPLETE CBC W/AUTO DIFF WBC: CPT

## 2024-09-04 PROCEDURE — 71046 X-RAY EXAM CHEST 2 VIEWS: CPT | Performed by: INTERNAL MEDICINE

## 2024-09-04 PROCEDURE — 83036 HEMOGLOBIN GLYCOSYLATED A1C: CPT

## 2024-09-04 PROCEDURE — 81003 URINALYSIS AUTO W/O SCOPE: CPT

## 2024-09-04 PROCEDURE — 82570 ASSAY OF URINE CREATININE: CPT

## 2024-09-04 PROCEDURE — 36415 COLL VENOUS BLD VENIPUNCTURE: CPT

## 2024-09-04 PROCEDURE — 82043 UR ALBUMIN QUANTITATIVE: CPT

## 2024-09-04 PROCEDURE — 84443 ASSAY THYROID STIM HORMONE: CPT

## 2024-09-04 PROCEDURE — 80053 COMPREHEN METABOLIC PANEL: CPT

## 2024-09-06 ENCOUNTER — HOSPITAL ENCOUNTER (OUTPATIENT)
Facility: HOSPITAL | Age: 59
Setting detail: OBSERVATION
Discharge: HOME | End: 2024-09-07
Attending: EMERGENCY MEDICINE | Admitting: STUDENT IN AN ORGANIZED HEALTH CARE EDUCATION/TRAINING PROGRAM
Payer: COMMERCIAL

## 2024-09-06 ENCOUNTER — APPOINTMENT (OUTPATIENT)
Dept: GENERAL RADIOLOGY | Facility: HOSPITAL | Age: 59
End: 2024-09-06
Attending: EMERGENCY MEDICINE
Payer: COMMERCIAL

## 2024-09-06 DIAGNOSIS — Z00.00 ANNUAL PHYSICAL EXAM: ICD-10-CM

## 2024-09-06 DIAGNOSIS — R94.39 ABNORMAL STRESS TEST: ICD-10-CM

## 2024-09-06 DIAGNOSIS — R07.9 CHEST PAIN OF UNCERTAIN ETIOLOGY: Primary | ICD-10-CM

## 2024-09-06 LAB
ALBUMIN SERPL-MCNC: 4.3 G/DL (ref 3.2–4.8)
ALBUMIN/GLOB SERPL: 1.6 {RATIO} (ref 1–2)
ALP LIVER SERPL-CCNC: 87 U/L
ALT SERPL-CCNC: 24 U/L
ANION GAP SERPL CALC-SCNC: 6 MMOL/L (ref 0–18)
AST SERPL-CCNC: 31 U/L (ref ?–34)
BASOPHILS # BLD AUTO: 0.02 X10(3) UL (ref 0–0.2)
BASOPHILS NFR BLD AUTO: 0.3 %
BILIRUB SERPL-MCNC: 0.4 MG/DL (ref 0.3–1.2)
BUN BLD-MCNC: 13 MG/DL (ref 9–23)
BUN/CREAT SERPL: 18.3 (ref 10–20)
CALCIUM BLD-MCNC: 9.1 MG/DL (ref 8.7–10.4)
CHLORIDE SERPL-SCNC: 109 MMOL/L (ref 98–112)
CO2 SERPL-SCNC: 26 MMOL/L (ref 21–32)
CREAT BLD-MCNC: 0.71 MG/DL
DEPRECATED RDW RBC AUTO: 37.8 FL (ref 35.1–46.3)
EGFRCR SERPLBLD CKD-EPI 2021: 106 ML/MIN/1.73M2 (ref 60–?)
EOSINOPHIL # BLD AUTO: 0.16 X10(3) UL (ref 0–0.7)
EOSINOPHIL NFR BLD AUTO: 2.3 %
ERYTHROCYTE [DISTWIDTH] IN BLOOD BY AUTOMATED COUNT: 11.8 % (ref 11–15)
GLOBULIN PLAS-MCNC: 2.7 G/DL (ref 2–3.5)
GLUCOSE BLD-MCNC: 99 MG/DL (ref 70–99)
HCT VFR BLD AUTO: 41.5 %
HGB BLD-MCNC: 14.5 G/DL
IMM GRANULOCYTES # BLD AUTO: 0.01 X10(3) UL (ref 0–1)
IMM GRANULOCYTES NFR BLD: 0.1 %
LYMPHOCYTES # BLD AUTO: 2.27 X10(3) UL (ref 1–4)
LYMPHOCYTES NFR BLD AUTO: 33.2 %
MCH RBC QN AUTO: 30.5 PG (ref 26–34)
MCHC RBC AUTO-ENTMCNC: 34.9 G/DL (ref 31–37)
MCV RBC AUTO: 87.2 FL
MONOCYTES # BLD AUTO: 0.64 X10(3) UL (ref 0.1–1)
MONOCYTES NFR BLD AUTO: 9.4 %
NEUTROPHILS # BLD AUTO: 3.74 X10 (3) UL (ref 1.5–7.7)
NEUTROPHILS # BLD AUTO: 3.74 X10(3) UL (ref 1.5–7.7)
NEUTROPHILS NFR BLD AUTO: 54.7 %
OSMOLALITY SERPL CALC.SUM OF ELEC: 292 MOSM/KG (ref 275–295)
PLATELET # BLD AUTO: 239 10(3)UL (ref 150–450)
POTASSIUM SERPL-SCNC: 4.1 MMOL/L (ref 3.5–5.1)
PROSTATE SPECIFIC AG: 2.1 NG/ML
PROSTATE SPECIFIC AG: 2.1 NG/ML
PROT SERPL-MCNC: 7 G/DL (ref 5.7–8.2)
RBC # BLD AUTO: 4.76 X10(6)UL
SODIUM SERPL-SCNC: 141 MMOL/L (ref 136–145)
TROPONIN I SERPL HS-MCNC: <3 NG/L
WBC # BLD AUTO: 6.8 X10(3) UL (ref 4–11)

## 2024-09-06 PROCEDURE — 99222 1ST HOSP IP/OBS MODERATE 55: CPT | Performed by: HOSPITALIST

## 2024-09-06 PROCEDURE — 71045 X-RAY EXAM CHEST 1 VIEW: CPT | Performed by: EMERGENCY MEDICINE

## 2024-09-06 RX ORDER — ASPIRIN 81 MG/1
324 TABLET, CHEWABLE ORAL ONCE
Status: COMPLETED | OUTPATIENT
Start: 2024-09-06 | End: 2024-09-06

## 2024-09-06 RX ORDER — NITROGLYCERIN 0.4 MG/1
0.4 TABLET SUBLINGUAL ONCE
Status: COMPLETED | OUTPATIENT
Start: 2024-09-06 | End: 2024-09-06

## 2024-09-07 VITALS
OXYGEN SATURATION: 94 % | WEIGHT: 190 LBS | HEIGHT: 66 IN | TEMPERATURE: 98 F | SYSTOLIC BLOOD PRESSURE: 109 MMHG | HEART RATE: 62 BPM | BODY MASS INDEX: 30.53 KG/M2 | RESPIRATION RATE: 20 BRPM | DIASTOLIC BLOOD PRESSURE: 69 MMHG

## 2024-09-07 DIAGNOSIS — Z00.00 ANNUAL PHYSICAL EXAM: Primary | ICD-10-CM

## 2024-09-07 PROBLEM — R94.39 ABNORMAL STRESS TEST: Status: ACTIVE | Noted: 2024-09-07

## 2024-09-07 LAB
ATRIAL RATE: 63 BPM
CHOLEST SERPL-MCNC: 79 MG/DL (ref ?–200)
HDLC SERPL-MCNC: 37 MG/DL (ref 40–59)
LDLC SERPL CALC-MCNC: 27 MG/DL (ref ?–100)
NONHDLC SERPL-MCNC: 42 MG/DL (ref ?–130)
P AXIS: 30 DEGREES
P-R INTERVAL: 174 MS
Q-T INTERVAL: 392 MS
QRS DURATION: 78 MS
QTC CALCULATION (BEZET): 401 MS
R AXIS: 36 DEGREES
T AXIS: 15 DEGREES
TRIGL SERPL-MCNC: 69 MG/DL (ref 30–149)
TROPONIN I SERPL HS-MCNC: <3 NG/L
VENTRICULAR RATE: 63 BPM
VLDLC SERPL CALC-MCNC: 9 MG/DL (ref 0–30)

## 2024-09-07 PROCEDURE — 99239 HOSP IP/OBS DSCHRG MGMT >30: CPT | Performed by: HOSPITALIST

## 2024-09-07 RX ORDER — ONDANSETRON 2 MG/ML
4 INJECTION INTRAMUSCULAR; INTRAVENOUS EVERY 6 HOURS PRN
Status: DISCONTINUED | OUTPATIENT
Start: 2024-09-07 | End: 2024-09-07

## 2024-09-07 RX ORDER — MAGNESIUM HYDROXIDE/ALUMINUM HYDROXICE/SIMETHICONE 120; 1200; 1200 MG/30ML; MG/30ML; MG/30ML
30 SUSPENSION ORAL 4 TIMES DAILY PRN
Status: DISCONTINUED | OUTPATIENT
Start: 2024-09-07 | End: 2024-09-07

## 2024-09-07 RX ORDER — METOPROLOL SUCCINATE 50 MG/1
50 TABLET, EXTENDED RELEASE ORAL DAILY
Status: DISCONTINUED | OUTPATIENT
Start: 2024-09-07 | End: 2024-09-07

## 2024-09-07 RX ORDER — ASPIRIN 81 MG/1
81 TABLET, CHEWABLE ORAL DAILY
Status: DISCONTINUED | OUTPATIENT
Start: 2024-09-07 | End: 2024-09-07

## 2024-09-07 RX ORDER — ROSUVASTATIN CALCIUM 20 MG/1
40 TABLET, COATED ORAL DAILY
Status: DISCONTINUED | OUTPATIENT
Start: 2024-09-07 | End: 2024-09-07

## 2024-09-07 RX ORDER — HEPARIN SODIUM 5000 [USP'U]/ML
5000 INJECTION, SOLUTION INTRAVENOUS; SUBCUTANEOUS EVERY 12 HOURS
Status: DISCONTINUED | OUTPATIENT
Start: 2024-09-07 | End: 2024-09-07

## 2024-09-07 RX ORDER — MORPHINE SULFATE 4 MG/ML
4 INJECTION, SOLUTION INTRAMUSCULAR; INTRAVENOUS EVERY 2 HOUR PRN
Status: DISCONTINUED | OUTPATIENT
Start: 2024-09-07 | End: 2024-09-07

## 2024-09-07 RX ORDER — ACETAMINOPHEN 325 MG/1
650 TABLET ORAL EVERY 6 HOURS PRN
Status: DISCONTINUED | OUTPATIENT
Start: 2024-09-07 | End: 2024-09-07

## 2024-09-07 RX ORDER — MORPHINE SULFATE 2 MG/ML
2 INJECTION, SOLUTION INTRAMUSCULAR; INTRAVENOUS EVERY 2 HOUR PRN
Status: DISCONTINUED | OUTPATIENT
Start: 2024-09-07 | End: 2024-09-07

## 2024-09-07 RX ORDER — ZOLPIDEM TARTRATE 5 MG/1
5 TABLET ORAL NIGHTLY PRN
Status: DISCONTINUED | OUTPATIENT
Start: 2024-09-07 | End: 2024-09-07

## 2024-09-07 NOTE — HISTORICAL OFFICE NOTE
[facility]  Outside Information  Continuity of Care Document  9/3/2024  Rui Saravia - 59 y.o. Male; born [birth year]Summary of episode note, generated on Sep. 07, 2024September 07, 2024   CHIEF COMPLAINT    CHIEF COMPLAINT  Reason for Visit/Chief Complaint   Consultation   57yo male with history of HTN, HLP initially referred for evaluation of chest pain. He went to the ED recently for worsening left sided chest pain. He has chronic left chest pain and became acutely worse last week and workup was negative. 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 He doesnt have worsening of chest pain with work. Doesnt exercise.Was assessed as noncardiac chest pain, had treadmill EKG April 2024 which was unremarkable and he was routinely followed. Since he has developed mild chest discomfort and shortness of breath although does not do any monitor exercise.     PROBLEMS  Reconcile with Patient's ChartPROBLEMS  Problem Effective Dates Date resolved Problem Status   Hyperglycemia, [SNOMED-CT: 10268904] 12/21/2017 - Active   Acute chest pain, [SNOMED-CT: 478847175] 11/21/2016 - Active   Dyspnea on exertion, [SNOMED-CT: 36515971] 11/21/2016 - Active     ENCOUNTER    ENCOUNTER  Problem Effective Dates Date resolved Problem Status   Hyperglycemia, [SNOMED-CT: 53824120] 12/21/2017 - Active   Acute chest pain, [SNOMED-CT: 616349144] 11/21/2016 - Active   Dyspnea on exertion, [SNOMED-CT: 23264847] 11/21/2016 - Active     VITAL SIGNS    VITAL SIGNS  Date / Time: 9/4/2024   BP Systolic 102 mmHg   BP Diastolic 64 mmHg   Height 66 inches   Weight 181 lbs   Pulse Rate 65 bpm   BSA (Body Surface Area) 2 cc/m2   BMI (Body Mass Index) 29.2 cc/m2   Blood Pressure 102 / 64 mmHg     PHYSICAL EXAMINATION    PHYSICAL EXAMINATION  Header Details   Constitutional 95%O2   Vitals Left Arm Sitting  / 64 mmHg, Pulse rate 65 bpm, Height in 5' 6\", BMI: 29.2, Weight in 180.78 lbs (or) 82  kgs, BSA : 1.98 cc/m²   General Appearance No Acute Distress, Well groomed   Cardiovascular      ALLERGIES, ADVERSE REACTIONS, ALERTS    No data available    MEDICATIONS ADMINISTERED DURING VISIT    No data available    MEDICATIONS  Reconcile with Patient's ChartMEDICATIONS  Medication Start Date Route/Frequency Status   aspirin 81 MG Oral Tab, [RxNorm: 349050] 9/22/2023 Take 1 tablet (81 mg total) by mouth daily. Active   diclofenac 1 % External Gel, [RxNorm: 758257] 9/22/2023 Apply 2 g topically 4 (four) times daily. Active   metoprolol succinate ER 50 mg tablet,extended release 24 hr, [RxNorm: 918932] 8/2/2024 Take 1 tablet orally once a day. Active   minoxidiL 2.5 mg tablet, [RxNorm: 432604] 8/2/2024 Take 1 tablet orally once a day. Active   omeprazole 20 MG Oral Capsule Delayed Release, [RxNorm: 122592] 9/22/2023 Take 1 capsule (20 mg total) by mouth daily. Active   rosuvastatin 40 mg tablet, [RxNorm: 246005] 8/2/2024 Take 1 tablet orally once a day. Active     ASSESSMENT    Chest pain/shortness of breath: Significant family history of premature coronary disease, elevated calcium score of 800, impaired fasting glucose therefore needs to have functional stress test with imaging. Treadmill EKG given his risk factors not reassuring. Continues to have symptoms. Treadmill SPECT with inferior defect  -[facility], radial approach, 2-3 weeksFamily history of CAD  Father with CAD/PCI  Brother with premature coronary disease and required CABG in his 40sHypertension: Controlled on current regimen, no changesHyperlipidemia: On high intensity statin for primary prevention, no changes, LDL 30Follow-up: Please give information regarding out portal access.  Clinic: 1 to 2 weeks after angiogram  Testing/intervention: angiogramIncreased BMI: Provide patient with information regarding diet and lifestyle changes.     FAMILY HISTORY    No data available    GENERAL STATUS    No data available    PAST MEDICAL HISTORY    PAST MEDICAL  HISTORY  Problem Date diagonsed Date resolved Status   Hyperglycemia, [SNOMED-CT: 62217506] 12/21/2017 - Active   Acute chest pain, [SNOMED-CT: 952717702] 11/21/2016 - Active   Dyspnea on exertion, [SNOMED-CT: 19836767] 11/21/2016 - Active     HISTORY OF PRESENT ILLNESS    57yo male with history of HTN, HLP initially referred for evaluation of chest pain. He went to the ED recently for worsening left sided chest pain. He has chronic left chest pain and became acutely worse last week and workup was negative. 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 He doesnt have worsening of chest pain with work. Doesnt exercise.Was assessed as noncardiac chest pain, had treadmill EKG April 2024 which was unremarkable and he was routinely followed. Since he has developed mild chest discomfort and shortness of breath although does not do any monitor exercise.     IMMUNIZATIONS    No data available    PLAN OF CARE    PLAN OF CARE  Planned Care Date   CBC w/ Differential 1/1/1900   BMP (Basic Metabolic Panel) 1/1/1900   Chest x-ray, PA and lateral 1/1/1900   EKG (electrocardiogram) 1/1/1900     PROCEDURES    No data available    RESULTS    RESULTS  Name Result Date Location - Ordered By   ALT [LOINC: 1742-6] 43 U/L 08/09/2024 08:52:00 AM [facility] ([facility])  Address: [address]  [facility]  [address]  tel:   AST [LOINC: 1920-8] 31 U/L 08/09/2024 08:52:00 AM [facility] ([facility])  Address: [address]  [facility]  [address]  tel:   CHOLESTEROL [LOINC: 2093-3] 94 mg/dL 08/09/2024 08:52:00 AM [facility] ([facility])  Address: [address]  [facility]  [address]  tel:   HDL CHOL [LOINC: 2085-9] 41 mg/dL 08/09/2024 08:52:00 AM [facility] ([facility])  Address: [address] [facility]  [address]  tel:   TRIGLYCERIDES [LOINC: 2571-8] 98 mg/dL 08/09/2024 08:52:00 AM [facility]  ([facility])  Address: [address]  [facility]  [address]  tel:   LDL CHOLESTROL [LOINC: 57809-9] 34 mg/dL 08/09/2024 08:52:00 AM [facility] ([facility])  Address: [address]  [facility]  [address]  tel:   VLDL [LOINC: 77252-7] 13 mg/dL 08/09/2024 08:52:00 AM [facility] ([facility])  Address: [address]  [facility]  [address]  tel:   NON HDL CHOL [LOINC: 83010-1] 53 mg/dL 08/09/2024 08:52:00 AM [facility] ([facility])  Address: [address]  [facility]  [address]  tel:   FASTING PATIENT LIPID ANSWER [LOINC: 76681214] Yes 08/09/2024 08:52:00 AM [facility] ([facility])  Address: [address]  [facility]  [address]  tel:   CBC w/ Differential [LOINC: 12616-7] Pending Schedule next available     BMP (Basic Metabolic Panel) [LOINC: 88047-9] Pending Schedule next available     Myocardial Perfusion Imaging 1.Stress EKG is normal. 2.Maximal exercise treadmill test (MPHR : 88 %).3.The functional capacity is good (11.0 METs).4.No chest pain5.No arrhythmias1.This scan is suggestive of low to moderate risk for future cardiovascular events. 2.Medium reversible perfusion abnormality of mild intensity in the inferior region. 3.The left ventricular cavity is noted to be normal on the stress study. The left ventricular ejection fraction was calculated to be 60% and left ventricular global function is normal. 4.The study quality is excellent. 5.This is an equivocal perfusion study. 8/30/2024 7:45:00 AM Chintan Timmons MD     REVIEW OF SYSTEMS    REVIEW OF SYSTEMS  Header Details   Cardiovascular No history of Chest pain, RICHARDS, Palpitations, Syncope, PND, Orthopnea, Edema, Claudication   Respiratory No history of SOB, Wheezing, Sputum   Hem/Lymphatic No history of Easy bruising, Blood clots, Hx of blood transfusion, Anemia, Bleeding problems     SOCIAL HISTORY    SOCIAL HISTORY  Social History Element  Description Effective Dates   Smoking status Former smoker -     FUNCTIONAL STATUS    No data available    INSTRUCTIONS    INSTRUCTIONS  NAME TYPE DATE   Increased BMI: Provide patient with information regarding diet and lifestyle changes. Goals 9/4/2024     MEDICAL EQUIPMENT    No data available    Goals Sections    No data available    REASON FOR REFERRAL         Health Concerns Section    No data available    COGNITIVE/MENTAL STATUS    No data available    Patient Demographics    Patient Demographics  Patient Address Patient Name Communication   [address] [place] [address] Rui Saravia [phone] (Mobile)     Patient Demographics  Language Race / Ethnicity Marital Status   English - Spoken (Preferred) White /  or       Document Information    Primary Care Provider Other Service Providers Document Coverage Dates   Chintan Timmons  NPI: [id number]  [phone] (Work)  [address] [facility], [address] [place] [address]  [place] [address]  Interpreting Physicians  [facility]  [phone] (Work)  [address] [place] [address] Kimberlysun FAGAN Jayne  NPI: [id number]  [phone] (Work)  [address] [facility], [address] [place] [address]  [place] [address]  Nurses     Rabia Navarro  NPI: [id number]  [phone] (Work)  [address] [facility], [address] [place] [address]  [place] [address]  Nurses Sep. 04, 2024September 04, 2024      Organization   [facility]  [phone] (Work)  [address] [facility], [address] [place] [address]  [place] [address]     Encounter Providers Encounter Date    Sep. 04, 2024September 04, 2024     Legal Authenticator    Chintan Timmons  NPI: [id number]  [phone] (Work)  [address] [facility], [address] [place] [address]  [place] [address]

## 2024-09-07 NOTE — PROGRESS NOTES
This RN spoke with Dr. Evans regarding pt wanting to go home instead of staying until Monday for HC.  Spoke with pt as well and okayed pt to go home.

## 2024-09-07 NOTE — ED PROVIDER NOTES
Patient Seen in: [facility] Emergency Department      History     Chief Complaint   Patient presents with    Chest Pain Angina     Stated Complaint: chest pain    Subjective:   59-year-old male with history of hyperlipidemia, hypertension, GERD, family history of heart disease here with chest discomfort that began when he woke up at 5 AM.  It has been there all day constant for 16 hours.  It seems to get a little worse with exertion.  No radiation.  He has had some sweating but no nausea.  No shortness of breath or dizziness.  No palpitations.  It is not pleuritic.  He has tried make it better with Tylenol but it does not work.  He did take a baby aspirin today.  No vomiting or diarrhea.  No cough or congestion.  Patient states he is scheduled to have an angiogram in about 10 days with Dr. Timmons.            Objective:   Past Medical History:    BPH (benign prostatic hyperplasia)    Coronary atherosclerosis    Disorder of liver    fatty liver    Esophageal reflux    H/O right inguinal hernia repair    High cholesterol    Hyperlipidemia    PONV (postoperative nausea and vomiting)    Visual impairment              Past Surgical History:   Procedure Laterality Date    Cholecystectomy      Laparoscopic     Hernia surgery Right     inguinal hernia    Vasectomy      Vasectomy                  Social History     Socioeconomic History    Marital status:     Number of children: 2   Occupational History    Occupation: [employer]     Comment: full time   Tobacco Use    Smoking status: Former     Current packs/day: 0.00     Average packs/day: 0.3 packs/day for 10.0 years (2.5 ttl pk-yrs)     Types: Cigarettes     Start date:      Quit date:      Years since quittin.7     Passive exposure: Past    Smokeless tobacco: Never    Tobacco comments:     1 unit per day, 20 years, 20 unit years, year quit  pr NG   Vaping Use    Vaping status: Never Used   Substance and Sexual Activity    Alcohol use: Yes      Comment: socially    Drug use: No   Other Topics Concern    Caffeine Concern Yes     Comment: Coffee, soda              Review of Systems    Positive for stated Chief Complaint: Chest Pain Angina    Other systems are as noted in HPI.  Constitutional and vital signs reviewed.      All other systems reviewed and negative except as noted above.    Physical Exam     ED Triage Vitals   BP 09/06/24 2059 116/76   Pulse 09/06/24 2059 58   Resp 09/06/24 2059 20   Temp 09/06/24 2059 98.4 °F (36.9 °C)   Temp src --    SpO2 09/06/24 2059 97 %   O2 Device 09/06/24 2145 None (Room air)       Current Vitals:   Vital Signs  BP: 125/81  Pulse: 56  Resp: 16  Temp: 98.4 °F (36.9 °C)    Oxygen Therapy  SpO2: 98 %  O2 Device: None (Room air)            Physical Exam  HENT:      Head: Normocephalic and atraumatic.      Right Ear: External ear normal.      Left Ear: External ear normal.      Nose: Nose normal.      Mouth/Throat:      Mouth: Mucous membranes are moist.   Eyes:      Extraocular Movements: Extraocular movements intact.      Pupils: Pupils are equal, round, and reactive to light.   Cardiovascular:      Rate and Rhythm: Normal rate and regular rhythm.      Pulses: Normal pulses.   Pulmonary:      Effort: Pulmonary effort is normal.      Breath sounds: Normal breath sounds.   Chest:      Chest wall: Tenderness present.   Abdominal:      Palpations: Abdomen is soft.      Tenderness: There is no abdominal tenderness.   Musculoskeletal:         General: No swelling. Normal range of motion.      Cervical back: Normal range of motion and neck supple.   Lymphadenopathy:      Cervical: No cervical adenopathy.   Skin:     General: Skin is warm.      Capillary Refill: Capillary refill takes less than 2 seconds.   Neurological:      General: No focal deficit present.      Mental Status: He is alert.      Sensory: No sensory deficit.      Motor: No weakness.               ED Course     Labs Reviewed   COMP METABOLIC PANEL (14) - Normal    TROPONIN I HIGH SENSITIVITY - Normal   CBC WITH DIFFERENTIAL WITH PLATELET     EKG    Rate, intervals and axes as noted on EKG Report.  Rate: 63  Rhythm: Sinus Rhythm  Reading: Normal sinus rhythm and rate.  Normal axis and intervals.  Normal ST segments.  This EKG was interpreted by me.normal            ED Course as of 09/06/24 2246  ------------------------------------------------------------  Time: 09/06 2245  Comment: Labs independently interpreted by me.  Troponin normal, CBC CMP normal, chest x-ray clear, first EKG normal.  Patient doing well.  Nitro helped his pain.  Aspirin given.  Discussed with cardiology and hospitalist will admit because he had an abnormal stress test recently.  Patient okay with plan              MDM      XR CHEST AP PORTABLE  (CPT=71045)    Result Date: 9/6/2024  CONCLUSION:  1. No acute cardiopulmonary finding.    Dictated by (CST): Ez Marrero MD on 9/06/2024 at 10:08 PM     Finalized by (CST): Ez Marrero MD on 9/06/2024 at 10:08 PM           Admission disposition: 9/6/2024 10:43 PM                                        Medical Decision Making  Patient with left-sided chest pain.  Differential is vast could include ACS, PE, pneumothorax, dissection, chest wall pain and many other possibilities.  With his risk factor profile will discuss with cardiology and do troponins.  First EKG is normal.  Normal sinus rhythm and rate on the monitor.  Pulse ox 97% room air.  Will give aspirin and nitro    Amount and/or Complexity of Data Reviewed  External Data Reviewed: notes.  Labs: ordered. Decision-making details documented in ED Course.  Radiology: ordered and independent interpretation performed. Decision-making details documented in ED Course.  ECG/medicine tests: ordered and independent interpretation performed. Decision-making details documented in ED Course.  Discussion of management or test interpretation with external provider(s): See ED course    Risk  Decision regarding  hospitalization.        Disposition and Plan     Clinical Impression:  1. Chest pain of uncertain etiology    2. Abnormal stress test         Disposition:  Admit  9/6/2024 10:43 pm    Follow-up:  No follow-up provider specified.        Medications Prescribed:  Current Discharge Medication List                            Hospital Problems       Present on Admission  Date Reviewed: 8/21/2024            ICD-10-CM Noted POA    * (Principal) Chest pain of uncertain etiology R07.9 9/6/2024 Unknown

## 2024-09-07 NOTE — IMAGING NOTE
[facility]  Outside Information  Continuity of Care Document  8/30/2024  Rui Saravia - 59 y.o. Male; born [birth year]Myocardial Perfusion Imaging, generated on Sep. 07, 2024September 07, 2024   Findings    Findings - Procedure  The patient performed treadmill exercise using a Luis protocol, completing 11:30 min and an estimated workload of 11.0 metabolic equivalents (METs). The heart rate was 54 beats per minute at baseline and increased to 142 beats per minute at peak exercise, which was 88% of the maximum predicted heart rate. The heart rate response was normal. The blood pressure response was normal with the baseline blood pressure 120/78 mmHg and maximum blood pressure of 160/80 mmHg. Stress testing was terminated due to fatigue. The resting EKG demonstrated sinus bradycardia. RSR' Functional capacity is good. There were no ST segment changes consistent with ischemia.    Findings - Procedure  Myocardial perfusion imaging was performed at rest following the intravenous injection of 10.7 mCi of Tc 99m Sestamibi via right hand IV access. At peak exercise, the patient was injected intravenously with 32.5 mCi of Tc 99m Sestamibi and exercise was continued for one minute. Gated post stress tomographic imaging was performed 25 minutes after stress testing. Patient was imaged in the supine position.    Findings - Nuclear Perfusion Findings  SPECT images reveal medium perfusion abnormality of mild intensity in the inferior region on the stress images. The rest images reveal homogenous tracer distrubution throughout the myocardium. Gated SPECT imaging reveals normal global myocardial wall motion. Gated SPECT imaging reveals normal global myocardial thickening. The stress wall motion noted in this study is not consistent with the perfusion scan. The left ventricular cavity is noted to be normal on the stress study. The left ventricular ejection fraction was calculated to be 60%  and left ventricular global function is normal. TCD/TID is normal (0.82).    Impressions    Impression - MPI  Stress EKG is normal.    Impression - MPI  Maximal exercise treadmill test (MPHR : 88 %).   Impression - MPI  The functional capacity is good (11.0 METs).   Impression - MPI  No chest pain   Impression - MPI  No arrhythmias   Impression - MPI  This scan is suggestive of low to moderate risk for future cardiovascular events.    Impression - MPI  Medium reversible perfusion abnormality of mild intensity in the inferior region.    Impression - MPI  The left ventricular cavity is noted to be normal on the stress study. The left ventricular ejection fraction was calculated to be 60% and left ventricular global function is normal.    Impression - MPI  The study quality is excellent.    Impression - MPI  This is an equivocal perfusion study.    Patient Demographics    Patient Demographics  Patient Address Patient Name Communication   [address] [place] [id number] Rui Saravia [phone] (Home)     Patient Demographics  Language Race / Ethnicity Marital Status   Unknown Unknown / Unknown Unknown     Document Information    Service Providers Document Coverage Dates   Chintan Timmons MD  [phone] (Work)  [address] [place] [id number] Aug. 30, 2024August 30, 2024

## 2024-09-07 NOTE — PLAN OF CARE
Notified by RN that patient decided he does not want to stay until Monday for coronary angiogram, he would prefer to go home and keep his currently scheduled outpatient procedure in 2 weeks. Confirmed with nighat Beyer for discharge home today.     Tammy Daniel, KANCHAN  09/07/24  2:03 PM  [phone] [place]  [phone] [place]

## 2024-09-07 NOTE — CONSULTS
[facility]  Cardiology Consultation    Rui Saravia Patient Status:  Observation    1965 MRN [id number]   Location [facility] 3W/SW Attending Alvaro Torres MD   Hosp Day # 0 PCP Nate Portillo MD     Date of Admission:  2024  Date of Consult:  2024  Reason for Consultation:   Chest pain    History of Present Illness:   Patient is a 59-year-old male who presents with chest discomfort.  Has been having chest discomfort on and off for several months, worse when lifting heavy objects but no other particular triggers.  He has a physical job that requires a lot of upper body movement.  He had a calcium score which was elevated in 173 and recently underwent nuclear stress test which noted reversible inferior defect.  Significant family history of heart disease with a brother who had CABG in his early 40s.  He was set up for a coronary angiogram in 2 weeks given risk factors and abnormal stress test.  He feels the symptoms began yesterday were worse than usual but now have mostly resolved.    Troponin < 3 x 2    CXR-no acute findings  ECG-sinus rhythm, 63 bpm    Cardiac history:  CAD, CAC of 873 on 2024  HTN  HLD    Past Medical History  Past Medical History:    BPH (benign prostatic hyperplasia)    Coronary atherosclerosis    Disorder of liver    fatty liver    Esophageal reflux    H/O right inguinal hernia repair    High cholesterol    Hyperlipidemia    PONV (postoperative nausea and vomiting)    Visual impairment     Past Surgical History  Past Surgical History:   Procedure Laterality Date    Cholecystectomy      Laparoscopic     Hernia surgery Right     inguinal hernia    Vasectomy      Vasectomy       Family History  Brother had CABG at age 42.  Father had MI in his 60s.  Family History   Problem Relation Age of Onset    Other (cirrhosis) Mother         cirrhosis of the liver    Diabetes Father     Heart Disease Father     Diabetes Sister     Diabetes Brother     Heart  Disease Brother     Other (CABG) Brother 42    Diabetes Brother     Diabetes Paternal Grandmother     Diabetes Paternal Grandfather     Other (No cancer-prostate or ) Other     Other (No urolithiasis) Other      Social History  Currently works a physical job in construction industry.  No tobacco, no alcohol use.  Pediatric History   Patient Parents    Not on file     Other Topics Concern     Service Not Asked    Blood Transfusions Not Asked    Caffeine Concern Yes     Comment: Coffee, soda    Occupational Exposure Not Asked    Hobby Hazards Not Asked    Sleep Concern Not Asked    Stress Concern Not Asked    Weight Concern Not Asked    Special Diet Not Asked    Back Care Not Asked    Exercise Not Asked    Bike Helmet Not Asked    Seat Belt Not Asked    Self-Exams Not Asked   Social History Narrative    Not on file         Current Medications:  Current Facility-Administered Medications   Medication Dose Route Frequency    aspirin chewable tab 81 mg  81 mg Oral Daily    metoprolol succinate ER (Toprol XL) 24 hr tab 50 mg  50 mg Oral Daily    rosuvastatin (Crestor) tab 40 mg  40 mg Oral Daily    pantoprazole (Protonix) 40 mg in sodium chloride 0.9% PF 10 mL IV push  40 mg Intravenous Daily    ondansetron (Zofran) 4 MG/2ML injection 4 mg  4 mg Intravenous Q6H PRN    morphINE PF 2 MG/ML injection 2 mg  2 mg Intravenous Q2H PRN    Or    morphINE PF 4 MG/ML injection 4 mg  4 mg Intravenous Q2H PRN    acetaminophen (Tylenol) tab 650 mg  650 mg Oral Q6H PRN    heparin (Porcine) 5000 UNIT/ML injection 5,000 Units  5,000 Units Subcutaneous Q12H    zolpidem (Ambien) tab 5 mg  5 mg Oral Nightly PRN    alum-mag hydroxide-simethicone (Maalox) 200-200-20 MG/5ML oral suspension 30 mL  30 mL Oral QID PRN     Medications Prior to Admission   Medication Sig    clobetasol 0.05 % External Solution USE TWICE DAILY MONDAY-FRIDAY WITH FLARES - DO NOT USE ON FACE    rosuvastatin 40 MG Oral Tab Take 1 tablet (40 mg total) by mouth  daily.    omeprazole 20 MG Oral Capsule Delayed Release Take 1 capsule (20 mg total) by mouth daily.    minoxidil 2.5 MG Oral Tab Take 1 tablet (2.5 mg total) by mouth daily.    diclofenac 1 % External Gel Apply 2 g topically 4 (four) times daily.    metoprolol succinate ER 50 MG Oral Tablet 24 Hr Take 1 tablet (50 mg total) by mouth daily.    aspirin 81 MG Oral Tab Take 1 tablet (81 mg total) by mouth daily.    [] polyethylene glycol, PEG 3350-KCl-NaBcb-NaCl-NaSulf, 236 g Oral Recon Soln Take 4,000 mL by mouth once for 1 dose. May substitute prescription with Golytely/Nulytely/Gavilyte and or generic equivalent, if needed. Take bowel preparation as provided by Gastroenterology, or visit our website at [email or web address] (Patient not taking: Reported on 2024)    ketoconazole 2 % External Shampoo USE 2 -3 TIMES WEEKLY- LATHER INTO SCALP AND LEAVE ON FOR 5 MINUTES BEFORE WASHING OFF     Allergies  No Known Allergies    Review of Systems:     14 point review of systems completed and negative except as noted.    Physical Exam:   Patient Vitals for the past 24 hrs:   BP Temp Temp src Pulse Resp SpO2 Height Weight   24 0913 109/69 97.5 °F (36.4 °C) Oral 53 20 94 % -- --   24 0519 109/73 97.8 °F (36.6 °C) Oral 54 20 95 % -- --   24 0050 -- -- -- -- -- -- 5' 6\" (1.676 m) 190 lb (86.2 kg)   24 004 113/86 97.9 °F (36.6 °C) Oral 53 18 98 % -- --   24 0041 -- -- -- 54 -- -- -- --   24 0005 114/76 -- -- 55 16 99 % -- --   24 2334 107/77 -- -- 50 14 99 % -- --   243 109/70 -- -- 51 16 97 % -- --   243 125/81 -- -- 56 16 98 % -- --   249 116/76 98.4 °F (36.9 °C) -- 58 20 97 % -- --     Intake/Output:   Last 3 shifts:   Intake/Output                   24 - 24 (Not Admitted) 24 - 24 - 24       Intake    Total Intake -- -- --        Output    Urine  --  --  --    Urine Occurrence -- 1 x --    Total Output -- -- --       Net I/O     -- -- --          Scheduled Meds:    aspirin  81 mg Oral Daily    metoprolol succinate ER  50 mg Oral Daily    rosuvastatin  40 mg Oral Daily    pantoprazole  40 mg Intravenous Daily    heparin  5,000 Units Subcutaneous Q12H     General: Alert and oriented x 3. No apparent distress.   HEENT: Normocephalic, anicteric sclera, neck supple, no thyromegaly or adenopathy.  Neck: No JVD, carotids 2+, no bruits.  Cardiac: Regular rate and rhythm. S1, S2 normal. No murmur, pericardial rub, S3, or extra cardiac sounds.  Lungs: Clear without wheezes, rales, rhonchi or dullness.  Normal excursions and effort.  Abdomen: Soft, non-tender. No organosplenomegally, mass or rebound, BS-present.  Extremities: Without clubbing or cyanosis. No edema.    Neurologic: Alert and oriented, normal affect. No focal defects  Skin: Warm and dry.     Results:   Laboratory Data:  Lab Results   Component Value Date    WBC 6.8 09/06/2024    HGB 14.5 09/06/2024    HCT 41.5 09/06/2024    .0 09/06/2024    CREATSERUM 0.71 09/06/2024    BUN 13 09/06/2024     09/06/2024    K 4.1 09/06/2024     09/06/2024    CO2 26.0 09/06/2024    GLU 99 09/06/2024    CA 9.1 09/06/2024    ALB 4.3 09/06/2024    ALKPHO 87 09/06/2024    TP 7.0 09/06/2024    AST 31 09/06/2024    ALT 24 09/06/2024    TSH 3.089 09/04/2024    PSA 2.1 09/04/2024    DDIMER 0.40 12/21/2017    MG 1.7 (L) 11/21/2016    TROP 0.01 12/21/2017    CK 81 08/26/2017         Recent Labs   Lab 09/04/24  1809 09/06/24  2147   GLU 91 99   BUN 11 13   CREATSERUM 0.71 0.71   CA 9.4 9.1    141   K 4.1 4.1    109   CO2 27.0 26.0     Recent Labs   Lab 09/04/24  1809 09/06/24  2147   RBC 4.82 4.76   HGB 14.9 14.5   HCT 42.2 41.5   MCV 87.6 87.2   MCH 30.9 30.5   MCHC 35.3 34.9   RDW 11.8 11.8   NEPRELIM 4.36 3.74   WBC 7.9 6.8   .0 239.0       No results for input(s): \"BNPML\" in  the last 168 hours.    No results for input(s): \"TROP\", \"CK\" in the last 168 hours.    Imaging:  XR CHEST AP PORTABLE  (CPT=71045)    Result Date: 9/6/2024  CONCLUSION:  1. No acute cardiopulmonary finding.    Dictated by (CST): Ez Marrero MD on 9/06/2024 at 10:08 PM     Finalized by (CST): Ez Marrero MD on 9/06/2024 at 10:08 PM           Impression:   CAD, CAC of 873 on 6/25/2024  - Presents with atypical chest pain, possibly musculoskeletal given physical job  - ECG normal, troponin negative x 2  - Patient does have risk factors including elevated calcium score, significant family history, and now recent nuclear stress test showing reversible inferior defect  - Patient be more comfortable getting angiogram done as inpatient, will keep until Monday for LHC +/- PCI.  N.p.o. midnight Sunday.    HTN  - Continue metoprolol    HLD  - Continue rosuvastatin    Thank you for allowing me to participate in the care of your patient.    Imer Sun MD  [facility]  9/7/2024

## 2024-09-07 NOTE — ED QUICK NOTES
Patient's CP improved and is now a 4/10. Patient now has a headache with a pain of 6/10. MD notified.

## 2024-09-07 NOTE — H&P
[facility]  part of [facility]    History & Physical    Rui Saravia Patient Status:  Emergency    1965 MRN [id number]   Location [facility] EMERGENCY DEPARTMENT Attending Esequiel Vasquez MD   Hosp Day # 0 PCP Nate Portillo MD     Date:  2024  Date of Admission:  2024    Chief Complaint:  Chief Complaint   Patient presents with    Chest Pain Angina       History of Present Illness:  Rui Saravia is a(n) 59 year old male, with a past medical history significant for hypertension, hyperlipidemia and GERD presents with a complaint of left-sided chest pain that started rather abruptly earlier today.  Claims he has been noticing a sharp shooting pain with no instigating factors mainly on left side and substernally with no clear aggravating or relieving factors.  Pain lasts a few minutes before spontaneously subsiding only to recur.  Denies any associated shortness of breath palpitations nausea vomiting or diaphoresis.  Patient recently underwent a stress test that was positive and was scheduled for an angiogram in the coming week    History:  Past Medical History:    BPH (benign prostatic hyperplasia)    Coronary atherosclerosis    Disorder of liver    fatty liver    Esophageal reflux    H/O right inguinal hernia repair    High cholesterol    Hyperlipidemia    PONV (postoperative nausea and vomiting)    Visual impairment     Past Surgical History:   Procedure Laterality Date    Cholecystectomy      Laparoscopic     Hernia surgery Right     inguinal hernia    Vasectomy      Vasectomy       Family History   Problem Relation Age of Onset    Other (cirrhosis) Mother         cirrhosis of the liver    Diabetes Father     Heart Disease Father     Diabetes Sister     Diabetes Brother     Heart Disease Brother     Other (CABG) Brother 42    Diabetes Brother     Diabetes Paternal Grandmother     Diabetes Paternal Grandfather     Other (No cancer-prostate or ) Other     Other (No  urolithiasis) Other       reports that he quit smoking about 33 years ago. His smoking use included cigarettes. He started smoking about 43 years ago. He has a 2.5 pack-year smoking history. He has been exposed to tobacco smoke. He has never used smokeless tobacco. He reports current alcohol use. He reports that he does not use drugs.    Allergies:  No Known Allergies    Home Medications:  Prior to Admission Medications   Prescriptions Last Dose Informant Patient Reported? Taking?   aspirin 81 MG Oral Tab   No No   Sig: Take 1 tablet (81 mg total) by mouth daily.   clobetasol 0.05 % External Solution   Yes No   Sig: USE TWICE DAILY MONDAY-FRIDAY WITH FLARES - DO NOT USE ON FACE   diclofenac 1 % External Gel   No No   Sig: Apply 2 g topically 4 (four) times daily.   ketoconazole 2 % External Shampoo   Yes No   Sig: USE 2 -3 TIMES WEEKLY- LATHER INTO SCALP AND LEAVE ON FOR 5 MINUTES BEFORE WASHING OFF   metoprolol succinate ER 50 MG Oral Tablet 24 Hr   Yes No   Sig: Take 1 tablet (50 mg total) by mouth daily.   minoxidil 2.5 MG Oral Tab   No No   Sig: Take 1 tablet (2.5 mg total) by mouth daily.   omeprazole 20 MG Oral Capsule Delayed Release   No No   Sig: Take 1 capsule (20 mg total) by mouth daily.   polyethylene glycol, PEG 3350-KCl-NaBcb-NaCl-NaSulf, 236 g Oral Recon Soln   No No   Sig: Take 4,000 mL by mouth once for 1 dose. May substitute prescription with Golytely/Nulytely/Gavilyte and or generic equivalent, if needed. Take bowel preparation as provided by Gastroenterology, or visit our website at [email or web address]   rosuvastatin 40 MG Oral Tab   Yes No   Sig: Take 1 tablet (40 mg total) by mouth daily.      Facility-Administered Medications: None       Review of Systems:  Constitutional:  Weakness, Fatigue.  Eye:  Negative.  Ear/Nose/Mouth/Throat:  Negative.  Respiratory:  Negative  Cardiovascular: Chest pain  Gastrointestinal:  Negative.  Genitourinary:   Negative  Endocrine:  Negative.  Immunologic:  Negative.  Musculoskeletal:  Negative.  Integumentary:  Negative.  Neurologic:  Negative.  Psychiatric:  Negative.  ROS reviewed as documented in chart    Physical Exam:  Temp:  [98.4 °F (36.9 °C)] 98.4 °F (36.9 °C)  Pulse:  [50-58] 50  Resp:  [14-20] 14  BP: (107-125)/(70-81) 107/77  SpO2:  [97 %-99 %] 99 %    General:  Alert and oriented.  Diffuse skin problem:  None.  Eye:  Pupils are equal, round and reactive to light, extraocular movements are intact, Normal conjunctiva.  HENT:  Normocephalic, oral mucosa is moist.  Head:  Normocephalic, atraumatic.  Neck:  Supple, non-tender, no carotid bruit, no jugular venous distention, no lymphadenopathy, no thyromegaly.  Respiratory:  Lungs are clear to auscultation, respirations are non-labored, breath sounds are equal, symmetrical chest wall expansion.  Cardiovascular:  Normal rate, regular rhythm, no murmur, no edema.  Gastrointestinal:  Soft, non-tender, non-distended, normal bowel sounds, no organomegaly.  Lymphatics:  No lymphadenopathy neck, axilla, groin.  Musculoskeletal: Normal range of motion.  normal strength.  Feet:  Normal pulses.  Neurologic:  Alert, oriented, no focal deficits, cranial nerves II-XII are grossly intact.  Cognition and Speech:  Oriented, speech clear and coherent.  Psychiatric:  Cooperative, appropriate mood & affect.      Laboratory Data:   Lab Results   Component Value Date    WBC 6.8 09/06/2024    HGB 14.5 09/06/2024    HCT 41.5 09/06/2024    .0 09/06/2024    CREATSERUM 0.71 09/06/2024    BUN 13 09/06/2024     09/06/2024    K 4.1 09/06/2024     09/06/2024    CO2 26.0 09/06/2024    GLU 99 09/06/2024    CA 9.1 09/06/2024    ALB 4.3 09/06/2024    ALKPHO 87 09/06/2024    BILT 0.4 09/06/2024    TP 7.0 09/06/2024    AST 31 09/06/2024    ALT 24 09/06/2024       Imaging:  XR CHEST AP PORTABLE  (CPT=71045)    Result Date: 9/6/2024  CONCLUSION:  1. No acute cardiopulmonary finding.     Dictated by (CST): Ez Marrero MD on 9/06/2024 at 10:08 PM     Finalized by (CST): Ez Marrero MD on 9/06/2024 at 10:08 PM            Assessment and Plan:    Chest pain likely ACS  Initial troponin negative EKG with no acute changes however patient with recent positive stress test, cardiology consulted for likely cardiac catheterization in AM.    Essential hypertension  Blood pressure well-controlled resume home meds.    Hyperlipidemia  Continue statin    Prophylaxis  Subcutaneous heparin    CODE STATUS  Full    Primary care physician  Nate Portillo MD    60 minutes spent on this admission - examining patient, obtaining history, reviewing previous medical records, going over test results/imaging and discussing plan of care. All questions answered.     Disposition  Clinical course will dictate outcome      NAM JIN MD  9/6/2024  11:37 PM

## 2024-09-07 NOTE — PLAN OF CARE
Rui Saravia Patient Status:  Observation    1965 MRN [id number]   Location [facility] 3W/SW Attending Ericka Mittal MD   Hosp Day # 0 PCP Nate Portillo MD       Cardiology Nocturnal APN Note    Page Received: Dr. Vasquez, ED Physician    HPI:     Patient is a 59 year old male with PMH of HTN, HLD, elevated calcium score of 800, and GERD who presented to the ED with c/o chest pain that started around 5 am. Pt stated pain has been persistent throughout the day and is worse with activity. Pt reported having diaphoresis but denied dyspnea or any other symptoms. Patient had an abnormal stress test on 2024 and is scheduled to have a coronary angiogram on 2024. Cardiac work up in ED was negative for acute findings. Echocardiogram completed 2024 showed normal left ventricular cavity size, wall thickness, and systolic function. Normal left ventricular diastolic dysfunction. EF 55-60%. HPI obtained from chart review and information provided by ED physician.       ED Clinical Course    EKG: Normal sinus rhythm. No acute ischemic changes.     Labs: Troponin negative     Imaging: CXR unremarkable    Medications: Aspirin 324 mg po          Assessment/Plan:    - Continue to trend troponin  - 2D echocardiogram pending  - Continue to monitor overnight on telemetry  - Formal cardiology consult to follow in AM.       KANCHAN Orlando  [facility]  2024  4:52 AM

## 2024-09-07 NOTE — PLAN OF CARE
Patient was provided with discharge instructions, education, and follow up information.  Patient verbalizes understanding of follow up information, specifically Cardiology and PCP. Patient has no questions after reviewing all instructions and will be going home.     Jean CARRASCO, Discharge Leader [id number]

## 2024-09-07 NOTE — ED QUICK NOTES
Rounding Completed    Plan of Care reviewed. Waiting for RN assignment to send patient up to floor.  Patient still with pain 4/10 in chest and head and states he's is doing fine, and declined medication.  Elimination needs assessed.  Respirations regular and unlabored.  Wife at bedside.    Bed is locked and in lowest position. Call light within reach.

## 2024-09-07 NOTE — ED QUICK NOTES
Patient presents to ED 65 from triage with c/o chest pain that began this morning. Patient states he did take a low dose aspirin and Tylenol with some improvement but did not go away. Denies SOB. Patient states he has a scheduled angiogram in 1 week. Patient is A&O x 4. No distress noted. Respirations regular and unlabored. Call light at reach.

## 2024-09-07 NOTE — ED INITIAL ASSESSMENT (HPI)
S: Chest pain since this am. Improvement throughout the day with tylenol but did not completely go away. Pt states that its not usually this persistent.

## 2024-09-07 NOTE — ED QUICK NOTES
Orders for admission, patient is aware of plan and ready to go upstairs. Any questions, please call ED RN Bia at extension [phone].     Patient Covid vaccination status: Fully vaccinated     COVID Test Ordered in ED: None    COVID Suspicion at Admission: N/A    Running Infusions:  None    Mental Status/LOC at time of transport: A&O x 4    Other pertinent information:   CIWA score: N/A   NIH score:  N/A

## 2024-09-07 NOTE — PROGRESS NOTES
Please relay to pt:    Hello FRANCESCA    after review of your labs here are your recommendations:      # CMP: Your comprehensive metabolic panel shows overall stable functioning kidneys (creatinine, GFR), liver (AST, ALT, Bilirubin), and electrolytes (sodium, potassium, calcium). Slight variations in other values such as BUN/Creat, Serum Osm, anion gap, chloride, etc are not of clinical value at this time.     # CBC: Your complete blood count shows overall stable red blood cells, white blood cells, platelets (help you stop bleeding), and hematocrit (thickness of blood),  Slight variations in other values such as RDW/sw, MCH are not of clinical value at this time.     # TSH: Your thyroid (TSH) function is normal.     # Vitamins: Your vitamin D level is Vitamin D Insufficiency (<30ng/mL) please start 2,000 International Units daily will recheck with next annual physical or sooner if clinically indicated    # Diabetes/A1C: Your A1C Last A1c value was 5.8% done 9/4/2024. which shows  prediabetes. This does not require medications unless your A1C reaches greater than 6.5, but if you would like to start medications to prevent the progression to diabetes please let me know. I would recommend increasing exercise and/or reducing your intake of carbohydrates, pastas, sweets, and sugary drinks/etc, drink more water, eat more vegetables instead of fruit, and try to lose 10% of your current bodyweight.  We will recheck your A1C in 3 months, please schedule a follow up office visit so we can recheck your A1C in the office using a finger stick test. You do not need to fast. If you prefer a video visit let me know so we can order a lab draw A1C/metabolic panel to be completed 3 days prior to our visit.     # Prostate: Your prostate level, ie PSA, is normal.     If you have any questions or concerns in regards to these labs please schedule a follow up and will gladly discuss.   -Dr. Portillo

## 2024-09-07 NOTE — PLAN OF CARE
Vital signs stable overnight, patient's chest pain remained mild, no PRNs given. CHG done and patient clipped in preparation for possible cardiac cath today.    Problem: Patient Centered Care  Goal: Patient preferences are identified and integrated in the patient's plan of care  Description: Interventions:  - What would you like us to know as we care for you? I'm from home with family.  - Provide timely, complete, and accurate information to patient/family  - Incorporate patient and family knowledge, values, beliefs, and cultural backgrounds into the planning and delivery of care  - Encourage patient/family to participate in care and decision-making at the level they choose  - Honor patient and family perspectives and choices  Outcome: Progressing     Problem: Patient/Family Goals  Goal: Patient/Family Long Term Goal  Description: Patient's Long Term Goal: Feel better    Interventions:  - Cardiac cath  - See additional Care Plan goals for specific interventions  Outcome: Progressing  Goal: Patient/Family Short Term Goal  Description: Patient's Short Term Goal: Go home    Interventions:   - Cardiology consult  - See additional Care Plan goals for specific interventions  Outcome: Progressing

## 2024-09-07 NOTE — DISCHARGE SUMMARY
[facility]ist Discharge Summary   Patient ID:  Rui Saravia  [id number]  59 year old  5/9/1965    Admit date: 9/6/2024  Discharge date: 9/7/2024  Primary Care Physician: Nate Portillo MD   Attending Physician: Alvaro Torres MD   Consults:   Consultants         Provider   Role Specialty     Chintan Timmons MD      Consulting Physician Interventional, Cardiology            Discharge Diagnoses:   Chest pain of uncertain etiology    Reason for admission  Copied from admission H&P: ***    Hospital Course:  ***    EXAM:   GENERAL: no apparent distress, comfortable  NEURO: A/A Ox3, no focal deficits  RESP: non labored, CTAB/L  CARDIO: Regular, no murmur  ABD: soft, NT, ND  EXTREMITIES: no edema, no calf tenderness    Operative Procedures:     Discharge Instructions     Medication List        CONTINUE taking these medications      aspirin 81 MG Tabs  Take 1 tablet (81 mg total) by mouth daily.     clobetasol 0.05 % Soln  Commonly known as: Temovate     diclofenac 1 % Gel  Commonly known as: Voltaren  Apply 2 g topically 4 (four) times daily.     ketoconazole 2 % Sham  Commonly known as: Nizoral     metoprolol succinate ER 50 MG Tb24  Commonly known as: Toprol XL     minoxidil 2.5 MG Tabs  Commonly known as: Loniten  Take 1 tablet (2.5 mg total) by mouth daily.     omeprazole 20 MG Cpdr  Commonly known as: PriLOSEC  Take 1 capsule (20 mg total) by mouth daily.     rosuvastatin 40 MG Tabs  Commonly known as: Crestor            STOP taking these medications      polyethylene glycol (PEG 3350-KCl-NaBcb-NaCl-NaSulf) 236 g Solr  Commonly known as: Golytely              Activity: {discharge activity:78674}  Diet: {diet:54614}  Wound Care: NA  Code Status: Full Code        Discharge Instructions         Return to Er for any worsening chest pain or SOB          Important follow up:   Follow-up Information       Nate Portillo MD Follow up in 1 week(s).    Specialty: Internal Medicine  Contact information:  [address]  [address]  [facility] [address]  [phone]               Imer Sun MD Follow up on 9/9/2024.    Specialties: Cardiovascular Diseases, CARDIOLOGY  Contact information:  [address]  [address]  [facility] [address]  [phone]                             -PCP in [] within 7 days [] within 14 days [] other     Disposition: {disposition:13451}  Discharged Condition: {condition:51328}    Hospital Discharge Diagnoses: {Enter hospital discharge diagnoses here (please select the wildcards and then replace with free text; this allows us to save this data discretely for reporting purposes:5961::\"***\"}    Lace+ Score: 37  59-90 High Risk  29-58 Medium Risk  0-28   Low Risk.    TCM Follow-Up Recommendation:  {Care Managers will evaluate the need for follow-up for all patients ages 50+, and high/moderate risk patients ages 25-49. Low risk patients (LACE < 29) will only be evaluated if the \"Still recommend for TCM follow-up\" option is selected from this list.:3968}            Total Time Coordinating Care: Greater than 30 minutes    Patient had opportunity to ask questions, state understanding, and agree with therapeutic plan as outlined    Alvaro Torres MD  Hospitalist  9/7/2024

## 2024-09-13 ENCOUNTER — PATIENT OUTREACH (OUTPATIENT)
Dept: CASE MANAGEMENT | Age: 59
End: 2024-09-13

## 2024-09-13 NOTE — PROGRESS NOTES
Called pt to schedule a PCP hospital follow-up appt :    Follow up with Nate Portillo in 1  week(s)  Specialty: Internal Medicine  [address]  [place] [address]  [phone]  No answer ; LVM to call back for assistance with scheduling

## 2024-09-17 NOTE — PROGRESS NOTES
Called pt to schedule a PCP hospital follow-up appt :     Follow up with Nate Portillo in 1  week(s)  Specialty: Internal Medicine  [address]  [place] [address]  [phone]    No answer ; LVM to call back for assistance with scheduling

## 2024-09-18 NOTE — PROGRESS NOTES
Called pt to schedule a PCP hospital follow-up appt :     Follow up with Nate Portillo in 1  week(s)  Specialty: Internal Medicine  [facility]  Medical [place] [address]  [phone]   Thursday 11/21 @4pm w/ Dr. Portillo (existing appt)      No answer ; LVM to call back for assistance with scheduling.  Mentioned that pt does have a upcoming appt but would she like to try and obtain a sooner appt       Closing encounter

## 2024-09-19 ENCOUNTER — HOSPITAL ENCOUNTER (OUTPATIENT)
Dept: INTERVENTIONAL RADIOLOGY/VASCULAR | Facility: HOSPITAL | Age: 59
Discharge: HOME | End: 2024-09-19
Attending: INTERNAL MEDICINE | Admitting: INTERNAL MEDICINE
Payer: COMMERCIAL

## 2024-09-19 VITALS
HEART RATE: 48 BPM | RESPIRATION RATE: 12 BRPM | BODY MASS INDEX: 29.09 KG/M2 | HEIGHT: 66 IN | DIASTOLIC BLOOD PRESSURE: 81 MMHG | SYSTOLIC BLOOD PRESSURE: 116 MMHG | TEMPERATURE: 98 F | WEIGHT: 181 LBS | OXYGEN SATURATION: 98 %

## 2024-09-19 DIAGNOSIS — R94.39 ABNORMAL NUCLEAR STRESS TEST: ICD-10-CM

## 2024-09-19 DIAGNOSIS — R93.1 ELEVATED CORONARY ARTERY CALCIUM SCORE: ICD-10-CM

## 2024-09-19 DIAGNOSIS — R07.9 CHEST PAIN: ICD-10-CM

## 2024-09-19 DIAGNOSIS — Z82.49 FAMILY HISTORY OF PREMATURE CORONARY HEART DISEASE: ICD-10-CM

## 2024-09-19 DIAGNOSIS — R06.02 SOB (SHORTNESS OF BREATH): ICD-10-CM

## 2024-09-19 PROCEDURE — 92978 ENDOLUMINL IVUS OCT C 1ST: CPT | Performed by: INTERNAL MEDICINE

## 2024-09-19 PROCEDURE — B2111ZZ FLUOROSCOPY OF MULTIPLE CORONARY ARTERIES USING LOW OSMOLAR CONTRAST: ICD-10-PCS | Performed by: INTERNAL MEDICINE

## 2024-09-19 PROCEDURE — B241ZZ3 ULTRASONOGRAPHY OF MULTIPLE CORONARY ARTERIES, INTRAVASCULAR: ICD-10-PCS | Performed by: INTERNAL MEDICINE

## 2024-09-19 PROCEDURE — 92979 ENDOLUMINL IVUS OCT C EA: CPT | Performed by: INTERNAL MEDICINE

## 2024-09-19 PROCEDURE — 99152 MOD SED SAME PHYS/QHP 5/>YRS: CPT | Performed by: INTERNAL MEDICINE

## 2024-09-19 PROCEDURE — 4A023N7 MEASUREMENT OF CARDIAC SAMPLING AND PRESSURE, LEFT HEART, PERCUTANEOUS APPROACH: ICD-10-PCS | Performed by: INTERNAL MEDICINE

## 2024-09-19 PROCEDURE — 93458 L HRT ARTERY/VENTRICLE ANGIO: CPT | Performed by: INTERNAL MEDICINE

## 2024-09-19 PROCEDURE — 36415 COLL VENOUS BLD VENIPUNCTURE: CPT

## 2024-09-19 RX ORDER — IOPAMIDOL 612 MG/ML
75 INJECTION, SOLUTION INTRAVASCULAR
Status: COMPLETED | OUTPATIENT
Start: 2024-09-19 | End: 2024-09-19

## 2024-09-19 RX ORDER — SODIUM CHLORIDE 9 MG/ML
3 INJECTION, SOLUTION INTRAVENOUS
Status: COMPLETED | OUTPATIENT
Start: 2024-09-19 | End: 2024-09-19

## 2024-09-19 RX ORDER — ASPIRIN 81 MG/1
324 TABLET, CHEWABLE ORAL ONCE
Status: DISCONTINUED | OUTPATIENT
Start: 2024-09-19 | End: 2024-09-19

## 2024-09-19 RX ORDER — VERAPAMIL HYDROCHLORIDE 2.5 MG/ML
INJECTION, SOLUTION INTRAVENOUS
Status: COMPLETED
Start: 2024-09-19 | End: 2024-09-19

## 2024-09-19 RX ORDER — MIDAZOLAM HYDROCHLORIDE 1 MG/ML
INJECTION INTRAMUSCULAR; INTRAVENOUS
Status: COMPLETED
Start: 2024-09-19 | End: 2024-09-19

## 2024-09-19 RX ORDER — NITROGLYCERIN 20 MG/100ML
INJECTION INTRAVENOUS
Status: COMPLETED
Start: 2024-09-19 | End: 2024-09-19

## 2024-09-19 RX ORDER — LIDOCAINE HYDROCHLORIDE 20 MG/ML
INJECTION, SOLUTION EPIDURAL; INFILTRATION; INTRACAUDAL; PERINEURAL
Status: COMPLETED
Start: 2024-09-19 | End: 2024-09-19

## 2024-09-19 RX ORDER — HEPARIN SODIUM 1000 [USP'U]/ML
INJECTION, SOLUTION INTRAVENOUS; SUBCUTANEOUS
Status: COMPLETED
Start: 2024-09-19 | End: 2024-09-19

## 2024-09-19 RX ADMIN — IOPAMIDOL 75 ML: 612 INJECTION, SOLUTION INTRAVASCULAR at 10:15:00

## 2024-09-19 RX ADMIN — SODIUM CHLORIDE 3 ML/KG/HR: 9 INJECTION, SOLUTION INTRAVENOUS at 08:45:00

## 2024-09-19 NOTE — INTERVAL H&P NOTE
Pre-op Diagnosis: * No pre-op diagnosis entered *    The above referenced H&P was reviewed by Chintan Timmons MD on 9/19/2024, the patient was examined and no significant changes have occurred in the patient's condition since the H&P was performed.  I discussed with the patient and/or legal representative the potential benefits, risks and side effects of this procedure; the likelihood of the patient achieving goals; and potential problems that might occur during recuperation.  I discussed reasonable alternatives to the procedure, including risks, benefits and side effects related to the alternatives and risks related to not receiving this procedure.  We will proceed with procedure as planned.

## 2024-09-19 NOTE — DISCHARGE INSTRUCTIONS
TransRadial Angiogram Discharge Instructions    The following instructions are for patients who have had an angiogram, cardiac cath, angioplasty, or stent through the radial artery.    Proper skin puncture site care is important during the healing period. This guideline should help to remind you of the verbal instructions you received from your physician or nurse.      Site Care:     For 5 days after the procedure, make sure wrist is not submerged in water or any liquid   Leave bandage in place for 24 hours. Then, gently wash with soap and water. Do not put any other bandage, ointment, powders, or creams to site.   For local swelling: apply ice   If bleeding occurs: Elevate hand above heart and apply local pressure    Activity/Driving     Avoid wrist flexion, extension, and fine motor activities (i.e. texting, typing, using computer mouse, etc.) for 24 hours   Do not drive for 24 hours   Do not lift or pull anything heavier than 5 pounds with affected hand for 1 week    When to contact physician:Call    at [phone]   right away if you experience     Swelling, pain, or bleeding at the site that is not relieved by applying ice or pressure   Signs of infection: Redness, warmth, drainage at the site, chills, or temperature of 100.5 or greater   Changes in sensation, numbness, or tingling of affected hand

## 2024-09-19 NOTE — IVS NOTE
DISCHARGE NOTE     Pt is able to sit up and ambulate without difficulty.   Pt voided and tolerated fluids and food.   Procedural site remains dry and intact with good circulation, motion, and sensation.   No signs and symptoms of bleeding/hematoma noted.   R arm with arm board with sling   IV access removed  Instruction provided, patient/family verbalizes understanding.   Dr. Timmons spoke with patient/family post procedure.     Pt discharge via wheelchair to [facility]     Follow up Appointment: 10/14     New Prescription: n/a

## 2024-09-19 NOTE — PROCEDURES
[facility]  part of [facility]    Cardiac Cath Procedure Note  Rui Saravia Patient Status:  Outpatient    1965 MRN [id number]   Location [facility] INTERVENTIONAL SUITES Attending No att. providers found   Hosp Day # 0 PCP Nate Portillo MD       Cardiologist: Chintan Timmons MD  Primary Proceduralist: Chintan Timmons MD  Procedure Performed: LHC and LV  Date of Procedure: 2024   Indication: Positive stress test    Summary of procedure:  Normal coronary anatomy however significant nonobstructive disease including left main and LAD based on intravascular ultrasound.      Recommendations:  Aggressive risk factor modification: On rosuvastatin 40 mg with LDL less than 50, needs aggressive diet, exercise and weight loss.      Left Ventriculography and hemodynamics:   LV EF not done  LV EDP 12 mmHg  No gradient across aortic valve        Coronary Angiography  RCA:  Dominant and free of obstructive disease, supplies PDA and PL    Left main:  Free of obstructive disease.  Angiographically in some views appears smaller than the LAD.    Left anterior descending:  Free of obstructive disease, supplies multiple diagonals which are non-obstructive    Circumflex:  Free of obstructive disease, supplies multiple OM branches which are patent      Left main intravascular ultrasound: EBU 3.75, Jania noble, [place] IVUS catheter used.  MLA 7 mm² which is nonobstructive however significant plaque burden in the circumflex, LAD and left main based on intravascular ultrasound.        Summary of Case: After written informed consent was obtained from the patient, patient was brought to the cardiac catheterization laboratory.  Patient was prepped and draped in the usual sterile fashion. Lidocaine 1% was used to infiltrate the right radial artery for local anesthesia and a 6 Pashto introducer sheath was inserted into the right radial artery.      Selective coronary angiography performed with JR4 catheter for RCA  and JL3.5 catheter for LCA.  Angiography performed in standard projections.      6 Fijian JR4 catheter placed in LV for hemodynamics.    Specimen sent to: No specimen collected  Estimated blood loss: 10 cc  Closure:  TR band      IV was maintained by RN and moderate conscious sedation of versed and fentanyl was given.  Patient was assessed and monitoring of oxygen, heart rate and blood pressure by nurse and myself during the exam 35 minutes.      Chintan Timmons MD  09/19/24

## 2024-10-17 DIAGNOSIS — K21.9 GASTROESOPHAGEAL REFLUX DISEASE WITHOUT ESOPHAGITIS: ICD-10-CM

## 2024-10-21 NOTE — TELEPHONE ENCOUNTER
Refill passes per [facility] protocol.    No future appointments with family medicine/internal medicine.  Last office visit: 8/21/2024    Requested Prescriptions   Pending Prescriptions Disp Refills    OMEPRAZOLE 20 MG Oral Capsule Delayed Release [Pharmacy Med Name: OMEPRAZOLE 20MG CAPSULES] 90 capsule 0     Sig: TAKE 1 CAPSULE(20 MG) BY MOUTH DAILY       Gastrointestional Medication Protocol Passed - 10/21/2024  9:03 AM        Passed - In person appointment or virtual visit in the past 12 mos or appointment in next 3 mos     Recent Outpatient Visits              1 month ago Screening for colon cancer    [facility] [place]    Nurse Only    2 months ago Annual physical exam    [facility], Nate Harrison MD    Office Visit    3 months ago [facility] artery [facility]    [facility]    6 months ago Chest pain, unspecified type    [facility], Kenny Mir MD    Office Visit    8 months ago Lower leg edema    [facility], Amna Mccall APRN    Office Visit          Future Appointments         Provider Department Appt Notes    In 1 month SAMANTHA PROCEDURE [facility] [place] Colonoscopy w/MAC @[facility]                         Future Appointments         Provider Department Appt Notes    In 1 month SAMANTHA PROCEDURE [facility] [place] Colonoscopy w/MAC @[facility]          Recent Outpatient Visits              1 month ago Screening for colon cancer    [facility] [place]    Nurse Only    2 months ago Annual physical exam    [facility], Nate Harrison MD    Office Visit    3 months ago Elevated coronary artery calcium score    [facility],  [place]ardo, Nate Harrison MD    Office Visit    6 months ago Chest pain, unspecified type    [facility], [address], Kenny Mir MD    Office Visit    8 months ago Lower leg edema    [facility], [address], Amna Mccall APRN    Office Visit

## 2024-11-13 ENCOUNTER — TELEPHONE (OUTPATIENT)
Dept: INTERNAL MEDICINE CLINIC | Facility: CLINIC | Age: 59
End: 2024-11-13

## 2024-11-13 DIAGNOSIS — Z12.12 SCREENING FOR COLORECTAL CANCER: Primary | ICD-10-CM

## 2024-11-13 DIAGNOSIS — Z12.11 SCREENING FOR COLORECTAL CANCER: Primary | ICD-10-CM

## 2024-11-19 DIAGNOSIS — R07.2 PRECORDIAL PAIN: ICD-10-CM

## 2024-11-20 NOTE — TELEPHONE ENCOUNTER
Protocol Failed/ No Protocol    Requested Prescriptions   Pending Prescriptions Disp Refills    diclofenac 1 % External Gel 100 g 1     Sig: Apply 2 g topically 4 (four) times daily.       There is no refill protocol information for this order          Future Appointments         Provider Department Appt Notes    [place] 2 [place] [facility] [place] [facility] [facility]          Recent Outpatient Visits              2 months ago Screening for colon cancer    [facility] [place]    Nurse Only    3 months ago [facility]    [facility]    4 months ago Elevated coronary artery calcium score    [facility]    [facility]    7 months ago Chest pain, unspecified type    [facility]    [facility]    9 months ago Lower leg edema    [facility], Amna Mccall APRN    Office Visit

## 2024-12-09 PROBLEM — K64.8 INTERNAL HEMORRHOIDS: Status: ACTIVE | Noted: 2024-12-09

## 2024-12-23 ENCOUNTER — TELEPHONE (OUTPATIENT)
Facility: CLINIC | Age: 59
End: 2024-12-23

## 2024-12-24 NOTE — TELEPHONE ENCOUNTER
Recall colonoscopy in 10 years per Dr. Arita    Colon done 12/9/2024    Health maintenance updated and message sent to patient outreach to repeat colonoscopy in 10 years

## 2025-02-27 DIAGNOSIS — R07.2 PRECORDIAL PAIN: ICD-10-CM

## 2025-03-03 NOTE — TELEPHONE ENCOUNTER
Please Review. Protocol Failed; No Protocol     Requested Prescriptions   Pending Prescriptions Disp Refills    diclofenac 1 % External Gel 100 g 1     Sig: Apply 2 g topically 4 (four) times daily.       There is no refill protocol information for this order

## 2025-03-13 RX ORDER — KETOCONAZOLE 20 MG/ML
1 SHAMPOO, SUSPENSION TOPICAL
Qty: 120 ML | Refills: 11 | Status: SHIPPED | OUTPATIENT
Start: 2025-03-13

## 2025-03-13 NOTE — TELEPHONE ENCOUNTER
Please review .protocol failed/ has no protocol    Last Office Visit: 08/21/2024  Medication is listed as patient reported.  Please advise if refill is appropriate.    Requested Prescriptions     Pending Prescriptions Disp Refills    ketoconazole 2 % External Shampoo  0

## 2025-03-17 ENCOUNTER — APPOINTMENT (OUTPATIENT)
Dept: GENERAL RADIOLOGY | Facility: HOSPITAL | Age: 60
End: 2025-03-17
Payer: COMMERCIAL

## 2025-03-17 ENCOUNTER — HOSPITAL ENCOUNTER (EMERGENCY)
Facility: HOSPITAL | Age: 60
Discharge: HOME | End: 2025-03-17
Attending: EMERGENCY MEDICINE
Payer: COMMERCIAL

## 2025-03-17 ENCOUNTER — APPOINTMENT (OUTPATIENT)
Dept: GENERAL RADIOLOGY | Facility: HOSPITAL | Age: 60
End: 2025-03-17
Attending: EMERGENCY MEDICINE
Payer: COMMERCIAL

## 2025-03-17 VITALS
RESPIRATION RATE: 13 BRPM | TEMPERATURE: 97 F | HEART RATE: 62 BPM | SYSTOLIC BLOOD PRESSURE: 125 MMHG | DIASTOLIC BLOOD PRESSURE: 76 MMHG | OXYGEN SATURATION: 98 %

## 2025-03-17 DIAGNOSIS — R07.9 CHEST PAIN OF UNCERTAIN ETIOLOGY: Primary | ICD-10-CM

## 2025-03-17 LAB
ALBUMIN SERPL-MCNC: 4.4 G/DL (ref 3.2–4.8)
ALBUMIN/GLOB SERPL: 2.1 {RATIO} (ref 1–2)
ALP LIVER SERPL-CCNC: 86 U/L
ALT SERPL-CCNC: 55 U/L
ANION GAP SERPL CALC-SCNC: 7 MMOL/L (ref 0–18)
AST SERPL-CCNC: 36 U/L (ref ?–34)
ATRIAL RATE: 73 BPM
BASOPHILS # BLD AUTO: 0.02 X10(3) UL (ref 0–0.2)
BASOPHILS NFR BLD AUTO: 0.3 %
BILIRUB SERPL-MCNC: 0.6 MG/DL (ref 0.3–1.2)
BUN BLD-MCNC: 10 MG/DL (ref 9–23)
BUN/CREAT SERPL: 14.1 (ref 10–20)
CALCIUM BLD-MCNC: 8.7 MG/DL (ref 8.7–10.4)
CHLORIDE SERPL-SCNC: 108 MMOL/L (ref 98–112)
CO2 SERPL-SCNC: 25 MMOL/L (ref 21–32)
CREAT BLD-MCNC: 0.71 MG/DL
D DIMER PPP FEU-MCNC: <0.27 UG/ML FEU (ref ?–0.59)
DEPRECATED RDW RBC AUTO: 37.5 FL (ref 35.1–46.3)
EGFRCR SERPLBLD CKD-EPI 2021: 106 ML/MIN/1.73M2 (ref 60–?)
EOSINOPHIL # BLD AUTO: 0.1 X10(3) UL (ref 0–0.7)
EOSINOPHIL NFR BLD AUTO: 1.7 %
ERYTHROCYTE [DISTWIDTH] IN BLOOD BY AUTOMATED COUNT: 11.8 % (ref 11–15)
GLOBULIN PLAS-MCNC: 2.1 G/DL (ref 2–3.5)
GLUCOSE BLD-MCNC: 114 MG/DL (ref 70–99)
HCT VFR BLD AUTO: 42.4 %
HGB BLD-MCNC: 15.1 G/DL
IMM GRANULOCYTES # BLD AUTO: 0 X10(3) UL (ref 0–1)
IMM GRANULOCYTES NFR BLD: 0 %
LYMPHOCYTES # BLD AUTO: 1.55 X10(3) UL (ref 1–4)
LYMPHOCYTES NFR BLD AUTO: 25.7 %
MCH RBC QN AUTO: 30.9 PG (ref 26–34)
MCHC RBC AUTO-ENTMCNC: 35.6 G/DL (ref 31–37)
MCV RBC AUTO: 86.9 FL
MONOCYTES # BLD AUTO: 0.41 X10(3) UL (ref 0.1–1)
MONOCYTES NFR BLD AUTO: 6.8 %
NEUTROPHILS # BLD AUTO: 3.96 X10 (3) UL (ref 1.5–7.7)
NEUTROPHILS # BLD AUTO: 3.96 X10(3) UL (ref 1.5–7.7)
NEUTROPHILS NFR BLD AUTO: 65.5 %
OSMOLALITY SERPL CALC.SUM OF ELEC: 290 MOSM/KG (ref 275–295)
P AXIS: 33 DEGREES
P-R INTERVAL: 168 MS
PLATELET # BLD AUTO: 225 10(3)UL (ref 150–450)
POTASSIUM SERPL-SCNC: 3.8 MMOL/L (ref 3.5–5.1)
PROT SERPL-MCNC: 6.5 G/DL (ref 5.7–8.2)
Q-T INTERVAL: 398 MS
QRS DURATION: 80 MS
QTC CALCULATION (BEZET): 438 MS
R AXIS: 28 DEGREES
RBC # BLD AUTO: 4.88 X10(6)UL
SODIUM SERPL-SCNC: 140 MMOL/L (ref 136–145)
T AXIS: -1 DEGREES
TROPONIN I SERPL HS-MCNC: 3 NG/L
VENTRICULAR RATE: 73 BPM
WBC # BLD AUTO: 6 X10(3) UL (ref 4–11)

## 2025-03-17 PROCEDURE — 99285 EMERGENCY DEPT VISIT HI MDM: CPT

## 2025-03-17 PROCEDURE — 85379 FIBRIN DEGRADATION QUANT: CPT | Performed by: EMERGENCY MEDICINE

## 2025-03-17 PROCEDURE — 85025 COMPLETE CBC W/AUTO DIFF WBC: CPT | Performed by: EMERGENCY MEDICINE

## 2025-03-17 PROCEDURE — 71045 X-RAY EXAM CHEST 1 VIEW: CPT | Performed by: EMERGENCY MEDICINE

## 2025-03-17 PROCEDURE — 36415 COLL VENOUS BLD VENIPUNCTURE: CPT

## 2025-03-17 PROCEDURE — 93005 ELECTROCARDIOGRAM TRACING: CPT

## 2025-03-17 PROCEDURE — 80053 COMPREHEN METABOLIC PANEL: CPT | Performed by: EMERGENCY MEDICINE

## 2025-03-17 PROCEDURE — 93010 ELECTROCARDIOGRAM REPORT: CPT

## 2025-03-17 PROCEDURE — 84484 ASSAY OF TROPONIN QUANT: CPT | Performed by: EMERGENCY MEDICINE

## 2025-03-17 PROCEDURE — 99284 EMERGENCY DEPT VISIT MOD MDM: CPT

## 2025-03-17 RX ORDER — LIDOCAINE HYDROCHLORIDE 20 MG/ML
10 SOLUTION OROPHARYNGEAL ONCE
Status: COMPLETED | OUTPATIENT
Start: 2025-03-17 | End: 2025-03-17

## 2025-03-17 RX ORDER — MAGNESIUM HYDROXIDE/ALUMINUM HYDROXICE/SIMETHICONE 120; 1200; 1200 MG/30ML; MG/30ML; MG/30ML
30 SUSPENSION ORAL ONCE
Status: COMPLETED | OUTPATIENT
Start: 2025-03-17 | End: 2025-03-17

## 2025-03-17 NOTE — ED PROVIDER NOTES
Patient Seen in: [facility] Emergency Department      History     Chief Complaint   Patient presents with    Chest Pain Angina     Stated Complaint: CP    Subjective:   HPI      59-year-old male with history of hypertension, hypercholesterolemia, coronary artery disease, BPH, and esophageal reflux presents with complaints of anterior chest pain.  The patient reports pain beginning 2 days ago.  He states it was at its maximum 2 days ago shortly after the onset of pain.  He describes a sharp pain to his anterior chest with occasional radiation to the back.  He states the pain has waxed and waned in intensity but has never resolved since its onset 2 days ago.  He denies associated dyspnea.  He denies cough, fever, or chills.  He denies any nausea or vomiting.  No diarrhea.  Denies any leg pain or swelling.  He denies history of DVT or PE.    Objective:     Past Medical History:    BPH (benign prostatic hyperplasia)    Coronary atherosclerosis    Disorder of liver    fatty liver    Esophageal reflux    Essential hypertension    H/O right inguinal hernia repair    High cholesterol    Hyperlipidemia    PONV (postoperative nausea and vomiting)    Screen for colon cancer    repeat CLN in 10 years    Visual impairment              Past Surgical History:   Procedure Laterality Date    Cholecystectomy      Laparoscopic     Colonoscopy N/A 2024    Procedure: COLONOSCOPY;  Surgeon: RAFFAELE Arita MD;  Location: [facility] MAIN OR    Hernia surgery Right     inguinal hernia    Vasectomy      Vasectomy                  Social History     Socioeconomic History    Marital status:     Number of children: 2   Occupational History    Occupation:      Comment: full time   Tobacco Use    Smoking status: Former     Current packs/day: 0.00     Average packs/day: 0.3 packs/day for 10.0 years (2.5 ttl pk-yrs)     Types: Cigarettes     Start date:      Quit date:      Years since quittin.2     Passive  exposure: Past    Smokeless tobacco: Never    Tobacco comments:     1 unit per day, 20 years, 20 unit years, year quit 1990 pr NG   Vaping Use    Vaping status: Never Used   Substance and Sexual Activity    Alcohol use: Yes     Comment: socially    Drug use: No   Other Topics Concern    Caffeine Concern Yes     Comment: Coffee, soda     Social Drivers of Health     Food Insecurity: No Food Insecurity (9/7/2024)    Food Insecurity     Food Insecurity: Never true   Transportation Needs: No Transportation Needs (9/7/2024)    Transportation Needs     Lack of Transportation: No   Housing Stability: Low Risk  (9/7/2024)    Housing Stability     Housing Instability: No                  Physical Exam     ED Triage Vitals [03/17/25 0714]   BP (!) 145/91   Pulse 64   Resp 20   Temp 97.1 °F (36.2 °C)   Temp src Temporal   SpO2 99 %   O2 Device None (Room air)       Current Vitals:   Vital Signs  BP: 115/81  Pulse: 69  Resp: 10  Temp: 97.1 °F (36.2 °C)  Temp src: Temporal  MAP (mmHg): 94    Oxygen Therapy  SpO2: 98 %  O2 Device: None (Room air)        Physical Exam    General Appearance: alert, no distress  Eyes: pupils equal and round no pallor or injection  ENT, Mouth: mucous membranes moist  Respiratory: there are no retractions, lungs are clear to auscultation.  No chest wall tenderness.  Cardiovascular: regular rate and rhythm  Gastrointestinal:  abdomen is soft and non tender, no masses, bowel sounds normal  Neurological: Speech normal.  Moving extremities x 4.  Skin: warm and dry, no rashes.  Musculoskeletal: neck is supple non tender        Extremities are symmetrical, full range of motion.  No leg edema or tenderness noted.  Psychiatric: patient is oriented X 3, there is no agitation    DIFFERENTIAL DIAGNOSIS: After history and physical exam differential diagnosis was considered for chest pain including chest wall pain, myocardial ischemia, pulmonary embolus and pulmonary infectious process.        ED Course     Labs  Reviewed   COMP METABOLIC PANEL (14) - Abnormal; Notable for the following components:       Result Value    Glucose 114 (*)     ALT 55 (*)     AST 36 (*)     A/G Ratio 2.1 (*)     All other components within normal limits   TROPONIN I HIGH SENSITIVITY - Normal   D-DIMER - Normal   CBC WITH DIFFERENTIAL WITH PLATELET     EKG    Rate, intervals and axes as noted on EKG Report.  Rate: 73  Rhythm: Sinus Rhythm  Axis: Normal  Reading: Normal EKG                     MDM      Chest x-ray was reviewed independently by me.  No pneumonia or pneumothorax noted.  Lab and EKG results noted.  No cause of the patient's pain found at this time.  Resting comfortably at present.  Will discharge home with recommendations to follow-up with his primary physician.  He is advised to return if increased pain, difficulty breathing, or other new symptoms develop.        Medical Decision Making      Disposition and Plan     Clinical Impression:  1. Chest pain of uncertain etiology         Disposition:  Discharge  3/17/2025  9:17 am    Follow-up:  Nate Portillo MD  [address] [place] [address]  [phone]    Follow up            Medications Prescribed:  Current Discharge Medication List              Supplementary Documentation:

## 2025-03-17 NOTE — DISCHARGE INSTRUCTIONS
Take Tylenol as needed for pain.  Follow-up with your primary physician for reevaluation.  Return to the emergency department if increased pain, difficulty breathing, or other new symptoms develop.

## 2025-03-17 NOTE — ED INITIAL ASSESSMENT (HPI)
Patient ambulatory to ED c/o mid chest \"discomfort\" started Saturday afternoon.   Patient states the discomfort radiates to back.    Hx. Calcification in heart arteries

## 2025-03-20 ENCOUNTER — OFFICE VISIT (OUTPATIENT)
Dept: FAMILY MEDICINE CLINIC | Facility: CLINIC | Age: 60
End: 2025-03-20
Payer: COMMERCIAL

## 2025-03-20 VITALS
BODY MASS INDEX: 31.02 KG/M2 | DIASTOLIC BLOOD PRESSURE: 79 MMHG | HEART RATE: 76 BPM | WEIGHT: 193 LBS | HEIGHT: 66 IN | SYSTOLIC BLOOD PRESSURE: 122 MMHG

## 2025-03-20 DIAGNOSIS — L60.8 MELANONYCHIA: Primary | ICD-10-CM

## 2025-03-20 PROCEDURE — 3074F SYST BP LT 130 MM HG: CPT | Performed by: PHYSICIAN ASSISTANT

## 2025-03-20 PROCEDURE — 3078F DIAST BP <80 MM HG: CPT | Performed by: PHYSICIAN ASSISTANT

## 2025-03-20 PROCEDURE — 3008F BODY MASS INDEX DOCD: CPT | Performed by: PHYSICIAN ASSISTANT

## 2025-03-20 PROCEDURE — 99213 OFFICE O/P EST LOW 20 MIN: CPT | Performed by: PHYSICIAN ASSISTANT

## 2025-03-20 NOTE — PROGRESS NOTES
HPI:     HPI  A 59-year-old man presents with a black line on his left thumb for the past weeks. He denies pain, numbness, or weakness.    Medications:     Current Outpatient Medications   Medication Sig Dispense Refill    ketoconazole 2 % External Shampoo Apply 1 Application topically twice a week. USE 2 -3 TIMES WEEKLY- LATHER INTO SCALP AND LEAVE ON FOR 5 MINUTES BEFORE WASHING  mL 11    diclofenac 1 % External Gel Apply 2 g topically 4 (four) times daily. 100 g 1    Multiple Vitamins-Minerals (MULTI VITAMIN/MINERALS) Oral Tab Take by mouth.      Omega-3 1000 MG Oral Cap Take by mouth.      Cholecalciferol 125 MCG (5000 UT) Oral Tab Take 1 tablet (5,000 Units total) by mouth daily.      Ascorbic Acid (VITAMIN C) 1000 MG Oral Tab Take 1 tablet (1,000 mg total) by mouth daily.      omeprazole 20 MG Oral Capsule Delayed Release Take 1 capsule (20 mg total) by mouth daily. 90 capsule 3    clobetasol 0.05 % External Solution USE TWICE DAILY MONDAY-FRIDAY WITH FLARES - DO NOT USE ON FACE      rosuvastatin 40 MG Oral Tab Take 1 tablet (40 mg total) by mouth daily.      minoxidil 2.5 MG Oral Tab Take 1 tablet (2.5 mg total) by mouth daily. 180 tablet 0    metoprolol succinate ER 50 MG Oral Tablet 24 Hr Take 1 tablet (50 mg total) by mouth daily.      aspirin 81 MG Oral Tab Take 1 tablet (81 mg total) by mouth daily. 30 tablet 5       Allergies:   Allergies[1]    History:     Health Maintenance   Topic Date Due    DTaP,Tdap,and Td Vaccines (1 - Tdap) Never done    Pneumococcal Vaccine: 50+ Years (1 of 1 - PCV) Never done    Zoster Vaccines (1 of 2) Never done    COVID-19 Vaccine (4 - 2024-25 season) 09/01/2024    Influenza Vaccine (1) Never done    Annual Depression Screening  01/01/2025    Annual Physical  08/21/2025    PSA  09/04/2026    Colorectal Cancer Screening  12/09/2034    Meningococcal B Vaccine  Aged Out       No LMP for male patient.   Past Medical History:     Past Medical History:    BPH (benign  prostatic hyperplasia)    Coronary atherosclerosis    Disorder of liver    fatty liver    Esophageal reflux    Essential hypertension    H/O right inguinal hernia repair    High cholesterol    Hyperlipidemia    PONV (postoperative nausea and vomiting)    Screen for colon cancer    repeat CLN in 10 years    Visual impairment       Past Surgical History:     Past Surgical History:   Procedure Laterality Date    Cholecystectomy      Laparoscopic     Colonoscopy N/A 2024    Procedure: COLONOSCOPY;  Surgeon: RAFFAELE Arita MD;  Location: [facility] MAIN OR    Hernia surgery Right     inguinal hernia    Vasectomy      Vasectomy         Family History:     Family History   Problem Relation Age of Onset    Other (cirrhosis) Mother         cirrhosis of the liver    Diabetes Father     Heart Disease Father     Diabetes Sister     Diabetes Brother     Heart Disease Brother     Other (CABG) Brother 42    Diabetes Brother     Diabetes Paternal Grandmother     Diabetes Paternal Grandfather     Other (No cancer-prostate or ) Other     Other (No urolithiasis) Other        Social History:     Social History     Socioeconomic History    Marital status:      Spouse name: Not on file    Number of children: 2    Years of education: Not on file    Highest education level: Not on file   Occupational History    Occupation:      Comment: full time   Tobacco Use    Smoking status: Former     Current packs/day: 0.00     Average packs/day: 0.3 packs/day for 10.0 years (2.5 ttl pk-yrs)     Types: Cigarettes     Start date:      Quit date:      Years since quittin.2     Passive exposure: Past    Smokeless tobacco: Never    Tobacco comments:     1 unit per day, 20 years, 20 unit years, year quit  pr NG   Vaping Use    Vaping status: Never Used   Substance and Sexual Activity    Alcohol use: Yes     Comment: socially    Drug use: No    Sexual activity: Not on file   Other Topics Concern     Service Not  Asked    Blood Transfusions Not Asked    Caffeine Concern Yes     Comment: Coffee, soda    Occupational Exposure Not Asked    Hobby Hazards Not Asked    Sleep Concern Not Asked    Stress Concern Not Asked    Weight Concern Not Asked    Special Diet Not Asked    Back Care Not Asked    Exercise Not Asked    Bike Helmet Not Asked    Seat Belt Not Asked    Self-Exams Not Asked   Social History Narrative    Not on file     Social Drivers of Health     Food Insecurity: No Food Insecurity (9/7/2024)    Food Insecurity     Food Insecurity: Never true   Transportation Needs: No Transportation Needs (9/7/2024)    Transportation Needs     Lack of Transportation: No     Car Seat: Not on file   Stress: Not on file   Housing Stability: Low Risk  (9/7/2024)    Housing Stability     Housing Instability: No     Housing Instability Emergency: Not on file     Crib or Bassinette: Not on file       Review of Systems:   Review of Systems   Skin:  Negative for rash.        Vitals:    03/20/25 1559   BP: 122/79   Pulse: 76   Weight: 193 lb (87.5 kg)   Height: 5' 6\" (1.676 m)     Body mass index is 31.15 kg/m².    Physical Exam:   Physical Exam  Vitals reviewed.      There is a mild black line on his left thumb.      Assessment and Plan::     Assessment & Plan  Melanonychia  Reassured the patient. Will observe at this time.          Discussed plan of care with pt and pt is in agreement.All questions answered. Pt to call with questions or concerns.         [1] No Known Allergies

## 2025-03-23 NOTE — PROGRESS NOTES
Subjective:   Rui Saravia is a 59 year old male who presents for Nail Care (vertical dark line on left thumb nail, past 3 wks to a month, wants 2nd option )   Patient is a pleasant 59-year-old male past medical history significant for BPH, CAD, fatty liver, reflux, hypertension, hyperlipidemia.  Patient presents office today new to this provider typically seeing internal medicine for evaluation of dark line on thumbnail.  Patient states he saw provider at [place] recently. He has worked for [employer] company for the past 32 years. He notes many of the boxes state that some components can cause cancer. He noticed he had a lesion on nail bed. It has been about 3 weeks. He saw another provider recently who advised melanonychia but would like second opinion as he is concerned about this. We discussed this and how they can be associated with trauma, damage to the nail bed, and darker skin tones. However they can be associated with melanoma as well. He would prefer definitive answer and he was given referral to derm for punch biopsy. No hx of blistering sunburns. No hx of skin cancer in family.         Past Medical History:    BPH (benign prostatic hyperplasia)    Coronary atherosclerosis    Disorder of liver    fatty liver    Esophageal reflux    Essential hypertension    H/O right inguinal hernia repair    High cholesterol    Hyperlipidemia    PONV (postoperative nausea and vomiting)    Screen for colon cancer    repeat CLN in 10 years    Visual impairment      Past Surgical History:   Procedure Laterality Date    Cholecystectomy      Laparoscopic     Colonoscopy N/A 12/9/2024    Procedure: COLONOSCOPY;  Surgeon: RAFFAELE Arita MD;  Location: [facility] MAIN OR    Hernia surgery Right     inguinal hernia    Vasectomy      Vasectomy          History/Other:    Chief Complaint Reviewed and Verified  Nursing Notes Reviewed and   Verified  Tobacco Reviewed  Allergies Reviewed  Medications Reviewed    Problem List  Reviewed  Medical History Reviewed  Surgical History   Reviewed  Family History Reviewed  Social History Reviewed         Tobacco:  He smoked tobacco in the past but quit greater than 12 months ago.  Social History     Tobacco Use   Smoking Status Former    Current packs/day: 0.00    Average packs/day: 0.3 packs/day for 10.0 years (2.5 ttl pk-yrs)    Types: Cigarettes    Start date:     Quit date:     Years since quittin.2    Passive exposure: Past   Smokeless Tobacco Never   Tobacco Comments    1 unit per day, 20 years, 20 unit years, year quit  pr NG        Current Outpatient Medications   Medication Sig Dispense Refill    ketoconazole 2 % External Shampoo Apply 1 Application topically twice a week. USE 2 -3 TIMES WEEKLY- LATHER INTO SCALP AND LEAVE ON FOR 5 MINUTES BEFORE WASHING  mL 11    diclofenac 1 % External Gel Apply 2 g topically 4 (four) times daily. 100 g 1    Multiple Vitamins-Minerals (MULTI VITAMIN/MINERALS) Oral Tab Take by mouth.      Omega-3 1000 MG Oral Cap Take by mouth.      Cholecalciferol 125 MCG (5000 UT) Oral Tab Take 1 tablet (5,000 Units total) by mouth daily.      Ascorbic Acid (VITAMIN C) 1000 MG Oral Tab Take 1 tablet (1,000 mg total) by mouth daily.      omeprazole 20 MG Oral Capsule Delayed Release Take 1 capsule (20 mg total) by mouth daily. 90 capsule 3    clobetasol 0.05 % External Solution USE TWICE DAILY MONDAY-FRIDAY WITH FLARES - DO NOT USE ON FACE      rosuvastatin 40 MG Oral Tab Take 1 tablet (40 mg total) by mouth daily.      minoxidil 2.5 MG Oral Tab Take 1 tablet (2.5 mg total) by mouth daily. 180 tablet 0    metoprolol succinate ER 50 MG Oral Tablet 24 Hr Take 1 tablet (50 mg total) by mouth daily.      aspirin 81 MG Oral Tab Take 1 tablet (81 mg total) by mouth daily. 30 tablet 5         Review of Systems:  Review of Systems   Respiratory:  Negative for choking and shortness of breath.    Cardiovascular:  Negative for chest pain.   Skin:   Positive for color change.         Objective:   /81 (BP Location: Right arm, Patient Position: Sitting, Cuff Size: adult)   Pulse 74   Ht 5' 6\" (1.676 m)   Wt 189 lb 3.2 oz (85.8 kg)   SpO2 96%   BMI 30.54 kg/m²  Estimated body mass index is 30.54 kg/m² as calculated from the following:    Height as of this encounter: 5' 6\" (1.676 m).    Weight as of this encounter: 189 lb 3.2 oz (85.8 kg).  Physical Exam  Vitals and nursing note reviewed.   Constitutional:       Appearance: Normal appearance.   HENT:      Head: Normocephalic and atraumatic.   Cardiovascular:      Rate and Rhythm: Normal rate and regular rhythm.      Pulses: Normal pulses.      Heart sounds: Normal heart sounds.   Pulmonary:      Effort: Pulmonary effort is normal.      Breath sounds: Normal breath sounds.   Musculoskeletal:        Arms:       Comments: Linear melanonychia to left thumb   Skin:     General: Skin is warm and dry.      Capillary Refill: Capillary refill takes less than 2 seconds.      Findings: Lesion present.   Neurological:      Mental Status: He is alert and oriented to person, place, and time.           Assessment & Plan:   1. Melanonychia (Primary)  -     Derm Referral - In Network  2. Need for tetanus booster  -     TETANUS, DIPHTHERIA TOXOIDS AND ACELLULAR PERTUSIS VACCINE (TDAP), >7 YEARS, IM USE    1. Melanonychia  Education provided  Referral to derm for punch biopsy  - Derm Referral - In Network    2. Need for tetanus booster  Tetanus in office today   - TETANUS, DIPHTHERIA TOXOIDS AND ACELLULAR PERTUSIS VACCINE (TDAP), >7 YEARS, IM USE    Patient aware of plan of care. All questions answered to satisfaction of the patient. Patient instructed to call office or reach out via [facility] if any issues arise. For urgent issues and/or reviewed red flags please proceed to the urgent care or ER.  Also, inform the nurse practitioner with any new symptoms or medication side effects.        Return if symptoms worsen or fail  to improve.    Henry Beckford, APRN, 3/23/2025, 12:24 PM

## 2025-03-27 ENCOUNTER — OFFICE VISIT (OUTPATIENT)
Dept: FAMILY MEDICINE CLINIC | Facility: CLINIC | Age: 60
End: 2025-03-27

## 2025-03-27 VITALS
WEIGHT: 189.19 LBS | HEART RATE: 74 BPM | DIASTOLIC BLOOD PRESSURE: 81 MMHG | OXYGEN SATURATION: 96 % | SYSTOLIC BLOOD PRESSURE: 118 MMHG | BODY MASS INDEX: 30.41 KG/M2 | HEIGHT: 66 IN

## 2025-03-27 DIAGNOSIS — L60.8 MELANONYCHIA: Primary | ICD-10-CM

## 2025-03-27 DIAGNOSIS — Z23 NEED FOR TETANUS BOOSTER: ICD-10-CM

## 2025-03-27 PROCEDURE — 3074F SYST BP LT 130 MM HG: CPT

## 2025-03-27 PROCEDURE — 3079F DIAST BP 80-89 MM HG: CPT

## 2025-03-27 PROCEDURE — 3008F BODY MASS INDEX DOCD: CPT

## 2025-03-27 PROCEDURE — 90471 IMMUNIZATION ADMIN: CPT

## 2025-03-27 PROCEDURE — 99213 OFFICE O/P EST LOW 20 MIN: CPT

## 2025-03-27 PROCEDURE — 90715 TDAP VACCINE 7 YRS/> IM: CPT

## 2025-04-25 DIAGNOSIS — L65.9 HAIR LOSS: ICD-10-CM

## 2025-04-29 RX ORDER — MINOXIDIL 2.5 MG/1
2.5 TABLET ORAL DAILY
Qty: 90 TABLET | Refills: 3 | Status: SHIPPED | OUTPATIENT
Start: 2025-04-29

## 2025-06-24 ENCOUNTER — TELEPHONE (OUTPATIENT)
Dept: CASE MANAGEMENT | Age: 60
End: 2025-06-24

## 2025-06-24 DIAGNOSIS — R93.1 ELEVATED CORONARY ARTERY CALCIUM SCORE: Primary | ICD-10-CM

## 2025-06-24 NOTE — TELEPHONE ENCOUNTER
Dr Beckford, *    Patient called requesting referral to Dr. Timmons.    Pended referral please review diagnosis and sign off if you agree.    Thank you.  Bethany العراقي  [facility]

## 2025-06-24 NOTE — TELEPHONE ENCOUNTER
Dr Portillo, *     Patient called requesting referral to Dr. Timmons.     Pended referral please review diagnosis and sign off if you agree.     Thank you.  Bethany العراقي  [facility]

## 2025-06-25 ENCOUNTER — LAB ENCOUNTER (OUTPATIENT)
Dept: LAB | Facility: HOSPITAL | Age: 60
End: 2025-06-25
Attending: INTERNAL MEDICINE
Payer: COMMERCIAL

## 2025-06-25 DIAGNOSIS — E78.2 MIXED HYPERLIPIDEMIA: Primary | ICD-10-CM

## 2025-06-25 LAB
ALT SERPL-CCNC: 28 U/L (ref 10–49)
AST SERPL-CCNC: 29 U/L (ref ?–34)
CHOLEST SERPL-MCNC: 121 MG/DL (ref ?–200)
FASTING PATIENT LIPID ANSWER: YES
HDLC SERPL-MCNC: 50 MG/DL (ref 40–59)
LDLC SERPL CALC-MCNC: 57 MG/DL (ref ?–100)
NONHDLC SERPL-MCNC: 71 MG/DL (ref ?–130)
TRIGL SERPL-MCNC: 69 MG/DL (ref 30–149)
VLDLC SERPL CALC-MCNC: 10 MG/DL (ref 0–30)

## 2025-06-25 PROCEDURE — 80061 LIPID PANEL: CPT

## 2025-06-25 PROCEDURE — 82172 ASSAY OF APOLIPOPROTEIN: CPT

## 2025-06-25 PROCEDURE — 83695 ASSAY OF LIPOPROTEIN(A): CPT

## 2025-06-25 PROCEDURE — 84450 TRANSFERASE (AST) (SGOT): CPT

## 2025-06-25 PROCEDURE — 84460 ALANINE AMINO (ALT) (SGPT): CPT

## 2025-06-25 PROCEDURE — 36415 COLL VENOUS BLD VENIPUNCTURE: CPT

## 2025-06-26 LAB — LIPOPROTEIN (A): 97 NMOL/L

## 2025-06-27 LAB — APOLIPOPROTEIN B: 57 MG/DL

## (undated) DEVICE — ISOVUE 300 10X100ML VIAL

## (undated) DEVICE — SOL  .9 1000ML BTL

## (undated) DEVICE — STERILE LATEX POWDER-FREE SURGICAL GLOVESWITH NITRILE COATING: Brand: PROTEXIS

## (undated) DEVICE — PLASTC TOOMEY SYRNG DISP

## (undated) DEVICE — UROLOGY DRAIN BAG STERILE

## (undated) DEVICE — SOL H2O 3000ML IRRIG

## (undated) DEVICE — CYSTO PACK: Brand: MEDLINE INDUSTRIES, INC.

## (undated) DEVICE — CATH URET CONE TIP 8FR 138008

## (undated) NOTE — LETTER
7/23/2017              [place] Jolanta        [address]        [address] [facility] [address]         Dear Mirna Mcneill,    On 7/17/17 urine for cytology showed no cancer cells in the urine. Please continue urology plans as last discussed.   I wish you the

## (undated) NOTE — LETTER
8/28/2017              Estela Lou        [address]        [place] [address]         Dear Kaleb Lantigua,    Bladder irrigation for cytology showed no cancer cells in the urine.   The radiologist x-ray physician reviewed the x-rays taken of yo

## (undated) NOTE — LETTER
Patricia Cason Md  [address] [place] [id number]       09/09/17        Patient: Sadiq Stover   YOB: 1965   Date of Visit: 9/9/2017       Dear  Dr. Kelly Lane MD,      Thank you for referring Sadiq Stover to my prac

## (undated) NOTE — LETTER
July 21, 2017         [facility] [id number]      Patient: Erin Melo   YOB: 1965   Date of Visit: 7/17/2017       Dear  Dr. Anmol Herrera MD,      I evaluated Erin Melo in the office tod Plan: Pt feels this condition is stable and chooses to continue observation.     (R35.1) Nocturia  Plan: Patient has current AUA score of 5, mild voiding dysfunction category, compared to previous score of 9, moderate voiding dysfunction category, on 3/17/

## (undated) NOTE — LETTER
No referring provider defined for this encounter. 07/17/17        Patient: Kavita Maximiliano   YOB: 1965   Date of Visit: 7/17/2017       Dear  Dr. Shobha Schmitt MD,      I evaluated Kavita Viera in the office today.   Please find my observation.     (R35.1) Nocturia  Plan: Patient has current AUA score of 5, mild voiding dysfunction category, compared to previous score of 9, moderate voiding dysfunction category, on 3/17/2015 per chart review.  Patient is not currently taking any  me

## (undated) NOTE — LETTER
Hospital Discharge Documentation  Please phone to schedule a hospital follow up appointment.     From: [address] Hospitalist's Office  Phone: [phone]    Patient discharged time/date: 12/22/2017  3:13 PM  Patient discharge disposition:  Home or [place] Consultants:  Consultants     Provider Role Specialty    Jake Johnson MD Consulting Physician  CARDIOLOGY      ?        Discharge Medication List:     Discharge Medications      CONTINUE taking these medications      Instructions Prescription details Extremities: No edema.   -----------------------------------------------------------------------------------------------  PATIENT DISCHARGE INSTRUCTIONS:         Hospital Discharge Diagnoses: cp    TCM Diagnosis at discharge from Hospital: Other: cp; no TCM

## (undated) NOTE — ED AVS SNAPSHOT
[facility]  [address]   [address] [place] [address]  Phone:  [phone]  Fax:  [phone]          iBllie Valente   MRN: [id number]    Department:  [facility]   Date of Visit:  7/3/2017 visiting [email or web address].    IF THERE IS ANY CHANGE OR WORSENING OF YOUR CONDITION, CALL YOUR PRIMARY CARE PHYSICIAN AT ONCE OR RETURN IMMEDIATELY TO THE EMERGENCY DEPARTMENT.     If you have been prescribed any medication(s), please fill your prescription